# Patient Record
Sex: FEMALE | Race: WHITE | Employment: OTHER | URBAN - METROPOLITAN AREA
[De-identification: names, ages, dates, MRNs, and addresses within clinical notes are randomized per-mention and may not be internally consistent; named-entity substitution may affect disease eponyms.]

---

## 2024-09-21 RX ORDER — SEMAGLUTIDE 2.68 MG/ML
2 INJECTION, SOLUTION SUBCUTANEOUS WEEKLY
COMMUNITY
Start: 2022-09-15

## 2024-09-24 RX ORDER — POTASSIUM CHLORIDE 750 MG/1
10 TABLET, EXTENDED RELEASE ORAL DAILY
COMMUNITY
Start: 2022-09-15

## 2024-09-24 RX ORDER — ENALAPRIL MALEATE 20 MG/1
20 TABLET ORAL EVERY MORNING
COMMUNITY
Start: 2024-08-28

## 2024-09-24 RX ORDER — MONTELUKAST SODIUM 10 MG/1
10 TABLET ORAL NIGHTLY
COMMUNITY
Start: 2024-03-08 | End: 2024-10-09

## 2024-09-24 RX ORDER — FEXOFENADINE HCL 180 MG/1
180 TABLET ORAL DAILY
COMMUNITY

## 2024-09-24 RX ORDER — ATORVASTATIN CALCIUM 10 MG/1
10 TABLET, FILM COATED ORAL NIGHTLY
COMMUNITY
Start: 2024-02-06

## 2024-09-24 RX ORDER — LEVOTHYROXINE SODIUM 50 UG/1
50 TABLET ORAL DAILY
COMMUNITY
Start: 2024-08-28

## 2024-09-24 RX ORDER — CARVEDILOL 3.12 MG/1
1 TABLET ORAL EVERY 12 HOURS
COMMUNITY
Start: 2022-09-15

## 2024-09-24 RX ORDER — FUROSEMIDE 20 MG/1
20 TABLET ORAL DAILY
COMMUNITY
Start: 2024-08-28

## 2024-09-26 ENCOUNTER — TELEPHONE (OUTPATIENT)
Dept: FAMILY MEDICINE CLINIC | Facility: CLINIC | Age: 69
End: 2024-09-26

## 2024-09-26 NOTE — TELEPHONE ENCOUNTER
LTHA on 10/08/24 with Dr. De La Paz @ [facility]    H&P- Completed 09/27/2024 with Dr. Eamon Costello   Labs- RBC (3.73), sodium (134), ALT (9), A1C (5.8), MRSA neg, all other labs WNL  EKG- Sinus sunday (56bpm), otherwise WNL

## 2024-09-27 ENCOUNTER — LAB ENCOUNTER (OUTPATIENT)
Dept: LAB | Facility: HOSPITAL | Age: 69
End: 2024-09-27
Attending: FAMILY MEDICINE
Payer: MEDICARE

## 2024-09-27 ENCOUNTER — OFFICE VISIT (OUTPATIENT)
Dept: FAMILY MEDICINE CLINIC | Facility: CLINIC | Age: 69
End: 2024-09-27
Payer: MEDICARE

## 2024-09-27 VITALS
WEIGHT: 202 LBS | SYSTOLIC BLOOD PRESSURE: 118 MMHG | TEMPERATURE: 97 F | BODY MASS INDEX: 33.25 KG/M2 | HEIGHT: 65.25 IN | RESPIRATION RATE: 16 BRPM | DIASTOLIC BLOOD PRESSURE: 68 MMHG | HEART RATE: 60 BPM | OXYGEN SATURATION: 98 %

## 2024-09-27 DIAGNOSIS — Z01.818 PREOP EXAMINATION: ICD-10-CM

## 2024-09-27 DIAGNOSIS — M16.12 PRIMARY OSTEOARTHRITIS OF LEFT HIP: Primary | ICD-10-CM

## 2024-09-27 DIAGNOSIS — I10 PRIMARY HYPERTENSION: ICD-10-CM

## 2024-09-27 DIAGNOSIS — M15.0 PRIMARY OSTEOARTHRITIS INVOLVING MULTIPLE JOINTS: ICD-10-CM

## 2024-09-27 DIAGNOSIS — R73.01 ELEVATED FASTING BLOOD SUGAR: ICD-10-CM

## 2024-09-27 DIAGNOSIS — E78.2 MIXED HYPERLIPIDEMIA: ICD-10-CM

## 2024-09-27 DIAGNOSIS — K58.0 IRRITABLE BOWEL SYNDROME WITH DIARRHEA: ICD-10-CM

## 2024-09-27 DIAGNOSIS — N18.31 STAGE 3A CHRONIC KIDNEY DISEASE (HCC): ICD-10-CM

## 2024-09-27 DIAGNOSIS — J45.20 MILD INTERMITTENT ASTHMA WITHOUT COMPLICATION (HCC): ICD-10-CM

## 2024-09-27 DIAGNOSIS — G47.33 OSA (OBSTRUCTIVE SLEEP APNEA): ICD-10-CM

## 2024-09-27 DIAGNOSIS — J30.9 ALLERGIC SINUSITIS: ICD-10-CM

## 2024-09-27 DIAGNOSIS — K21.00 GASTROESOPHAGEAL REFLUX DISEASE WITH ESOPHAGITIS WITHOUT HEMORRHAGE: ICD-10-CM

## 2024-09-27 DIAGNOSIS — E03.9 ACQUIRED HYPOTHYROIDISM: ICD-10-CM

## 2024-09-27 DIAGNOSIS — F41.9 ANXIETY: ICD-10-CM

## 2024-09-27 DIAGNOSIS — E11.9 TYPE 2 DIABETES MELLITUS WITHOUT COMPLICATION, WITHOUT LONG-TERM CURRENT USE OF INSULIN (HCC): ICD-10-CM

## 2024-09-27 DIAGNOSIS — Z01.812 PRE-OPERATIVE LABORATORY EXAMINATION: ICD-10-CM

## 2024-09-27 DIAGNOSIS — Z87.898 HISTORY OF POSTOPERATIVE NAUSEA AND VOMITING: ICD-10-CM

## 2024-09-27 LAB
ALBUMIN SERPL-MCNC: 4.1 G/DL (ref 3.2–4.8)
ALBUMIN/GLOB SERPL: 1.5 {RATIO} (ref 1–2)
ALP LIVER SERPL-CCNC: 72 U/L
ALT SERPL-CCNC: 9 U/L
ANION GAP SERPL CALC-SCNC: 3 MMOL/L (ref 0–18)
ANTIBODY SCREEN: NEGATIVE
AST SERPL-CCNC: 17 U/L (ref ?–34)
ATRIAL RATE: 56 BPM
BASOPHILS # BLD AUTO: 0.13 X10(3) UL (ref 0–0.2)
BASOPHILS NFR BLD AUTO: 1.6 %
BILIRUB SERPL-MCNC: 0.4 MG/DL (ref 0.2–1.1)
BUN BLD-MCNC: 11 MG/DL (ref 9–23)
BUN/CREAT SERPL: 10.9 (ref 10–20)
CALCIUM BLD-MCNC: 10 MG/DL (ref 8.7–10.4)
CHLORIDE SERPL-SCNC: 102 MMOL/L (ref 98–112)
CO2 SERPL-SCNC: 29 MMOL/L (ref 21–32)
CREAT BLD-MCNC: 1.01 MG/DL
DEPRECATED RDW RBC AUTO: 42.3 FL (ref 35.1–46.3)
EGFRCR SERPLBLD CKD-EPI 2021: 60 ML/MIN/1.73M2 (ref 60–?)
EOSINOPHIL # BLD AUTO: 0.25 X10(3) UL (ref 0–0.7)
EOSINOPHIL NFR BLD AUTO: 3 %
ERYTHROCYTE [DISTWIDTH] IN BLOOD BY AUTOMATED COUNT: 11.9 % (ref 11–15)
EST. AVERAGE GLUCOSE BLD GHB EST-MCNC: 120 MG/DL (ref 68–126)
FASTING STATUS PATIENT QL REPORTED: YES
GLOBULIN PLAS-MCNC: 2.8 G/DL (ref 2–3.5)
GLUCOSE BLD-MCNC: 99 MG/DL (ref 70–99)
HBA1C MFR BLD: 5.8 % (ref ?–5.7)
HCT VFR BLD AUTO: 36.1 %
HGB BLD-MCNC: 12.3 G/DL
IMM GRANULOCYTES # BLD AUTO: 0.02 X10(3) UL (ref 0–1)
IMM GRANULOCYTES NFR BLD: 0.2 %
LYMPHOCYTES # BLD AUTO: 2.76 X10(3) UL (ref 1–4)
LYMPHOCYTES NFR BLD AUTO: 33 %
MCH RBC QN AUTO: 33 PG (ref 26–34)
MCHC RBC AUTO-ENTMCNC: 34.1 G/DL (ref 31–37)
MCV RBC AUTO: 96.8 FL
MONOCYTES # BLD AUTO: 0.78 X10(3) UL (ref 0.1–1)
MONOCYTES NFR BLD AUTO: 9.3 %
NEUTROPHILS # BLD AUTO: 4.42 X10 (3) UL (ref 1.5–7.7)
NEUTROPHILS # BLD AUTO: 4.42 X10(3) UL (ref 1.5–7.7)
NEUTROPHILS NFR BLD AUTO: 52.9 %
OSMOLALITY SERPL CALC.SUM OF ELEC: 277 MOSM/KG (ref 275–295)
P AXIS: 87 DEGREES
P-R INTERVAL: 182 MS
PLATELET # BLD AUTO: 347 10(3)UL (ref 150–450)
POTASSIUM SERPL-SCNC: 4.5 MMOL/L (ref 3.5–5.1)
PROT SERPL-MCNC: 6.9 G/DL (ref 5.7–8.2)
Q-T INTERVAL: 438 MS
QRS DURATION: 78 MS
QTC CALCULATION (BEZET): 422 MS
R AXIS: 66 DEGREES
RBC # BLD AUTO: 3.73 X10(6)UL
RH BLOOD TYPE: POSITIVE
SODIUM SERPL-SCNC: 134 MMOL/L (ref 136–145)
T AXIS: 72 DEGREES
VENTRICULAR RATE: 56 BPM
WBC # BLD AUTO: 8.4 X10(3) UL (ref 4–11)

## 2024-09-27 PROCEDURE — 86901 BLOOD TYPING SEROLOGIC RH(D): CPT | Performed by: FAMILY MEDICINE

## 2024-09-27 PROCEDURE — 86900 BLOOD TYPING SEROLOGIC ABO: CPT | Performed by: FAMILY MEDICINE

## 2024-09-27 PROCEDURE — 86850 RBC ANTIBODY SCREEN: CPT | Performed by: FAMILY MEDICINE

## 2024-09-27 PROCEDURE — 93010 ELECTROCARDIOGRAM REPORT: CPT | Performed by: INTERNAL MEDICINE

## 2024-09-27 PROCEDURE — 36415 COLL VENOUS BLD VENIPUNCTURE: CPT | Performed by: FAMILY MEDICINE

## 2024-09-27 PROCEDURE — 93005 ELECTROCARDIOGRAM TRACING: CPT

## 2024-09-27 PROCEDURE — 87081 CULTURE SCREEN ONLY: CPT

## 2024-09-27 PROCEDURE — 83036 HEMOGLOBIN GLYCOSYLATED A1C: CPT

## 2024-09-27 PROCEDURE — 99204 OFFICE O/P NEW MOD 45 MIN: CPT | Performed by: FAMILY MEDICINE

## 2024-09-27 PROCEDURE — 80053 COMPREHEN METABOLIC PANEL: CPT

## 2024-09-27 PROCEDURE — 85025 COMPLETE CBC W/AUTO DIFF WBC: CPT

## 2024-09-27 NOTE — H&P (VIEW-ONLY)
[facility] PRE-OP CLINIC [place]    PRE-OP NOTE    HPI:   I have been consulted by Dr. De La Paz to see Samira Nicolas 69 year old female for a preoperative evaluation and medical clearance. She  has a long history of worsening severe degenerative arthritis of her left hip . Patient is to have a left total hip arthroplasty  by Dr. DeL a Paz  on 10/8/2024.     Pt suffers significant pain and loss of function.     She has no cardiopulmonary symptoms.      No history of  DVT. Denies tobacco use.       She has untreated GRACY      Family History   Problem Relation Age of Onset    Other (lung ca) Father     Brain Cancer Mother     Other (lung ca) Mother     Other (HR2) Sister     Other (stomach tummor) Brother       Current Outpatient Medications   Medication Sig Dispense Refill    fexofenadine 180 MG Oral Tab Take 1 tablet (180 mg total) by mouth daily.      furosemide 20 MG Oral Tab Take 1 tablet (20 mg total) by mouth daily.      levothyroxine 50 MCG Oral Tab Take 1 tablet (50 mcg total) by mouth daily.      carvedilol 3.125 MG Oral Tab Take 1 tablet (3.125 mg total) by mouth Q12H.      enalapril 20 MG Oral Tab Take 1 tablet (20 mg total) by mouth every morning.      FLUoxetine 20 MG Oral Cap Take 1 capsule (20 mg total) by mouth daily.      cholecalciferol 125 MCG (5000 UT) Oral Tab Take 1 tablet (5,000 Units total) by mouth daily.      metFORMIN 500 MG Oral Tab Take 1 tablet (500 mg total) by mouth 2 (two) times daily with meals.      atorvastatin 10 MG Oral Tab Take 1 tablet (10 mg total) by mouth nightly.      montelukast 10 MG Oral Tab Take 1 tablet (10 mg total) by mouth nightly.      Potassium Chloride ER 10 MEQ Oral Tab CR Take 1 tablet (10 mEq total) by mouth daily.      omeprazole 20 MG Oral Capsule Delayed Release Take 1 capsule (20 mg total) by mouth every morning.      semaglutide (OZEMPIC, 2 MG/DOSE,) 8 MG/3ML Subcutaneous Solution Pen-injector Inject 2 mg into the skin once a week.       Past Medical History:     Anesthesia complication    slow to wake post-op    Anxiety state    Arthritis    Asthma (HCC)    mild- due year round allergies    Disorder of thyroid    hypothyroidism    Esophageal reflux    High blood pressure    High cholesterol    IBS (irritable bowel syndrome)    Osteoarthritis    PONV (postoperative nausea and vomiting)    Renal disorder    virus that spills protien    Sleep apnea    does not use CPAP due to weight loss    Type 2 Diabetes     Past Surgical History:   Procedure Laterality Date    Cholecystectomy      Colonoscopy      Hip replacement surgery Right 2016     Social History     Socioeconomic History    Marital status: Not on file     Spouse name: Not on file    Number of children: Not on file    Years of education: Not on file    Highest education level: Not on file   Occupational History    Not on file   Tobacco Use    Smoking status: Former     Types: Cigarettes     Passive exposure: Never    Smokeless tobacco: Never    Tobacco comments:     Quit 2014   Vaping Use    Vaping status: Never Used   Substance and Sexual Activity    Alcohol use: Not Currently     Alcohol/week: 1.0 standard drink of alcohol     Types: 1 Glasses of wine per week    Drug use: Never    Sexual activity: Not on file   Other Topics Concern    Not on file   Social History Narrative    Not on file     Social Determinants of Health     Financial Resource Strain: Not on file   Food Insecurity: Not on file   Transportation Needs: Not on file   Physical Activity: Not on file   Stress: Not on file   Social Connections: Unknown (3/8/2021)    Received from [facility]    Social Connections     Conversations with friends/family/neighbors per week: Not on file   Housing Stability: Low Risk  (7/8/2021)    Received from [facility]    Housing Stability     Mortgage Payment Concerns?: Not on file     Number of Places Lived in the Last Year: Not on file     Unstable Housing?: Not on file        REVIEW OF SYSTEMS:   CONSTITUTIONAL:  Denies unusual weight gain/loss, fever, chills  EENT:  Eyes:  Denies eye pain, visual loss, blurred vision, double vision. Ears, Nose, Throat:  Denies congestion, runny nose or sore throat.  INTEGUMENTARY:  Denies rashes, itching, skin lesion,   CARDIOVASCULAR:  Denies DVT. Denies chest pain, palpitations, edema, dyspnea  RESPIRATORY: has  GRACY ,Denies shortness of breath, wheezing, cough  GASTROINTESTINAL:  Denies abdominal pain, nausea, vomiting, constipation, diarrhea, or blood in stool.  MUSCULOSKELETAL: severe pain to her left hip as noted above  NEUROLOGICAL:  Denies headache, seizures, dizziness, syncope, paralysis, ataxia,  HEMATOLOGIC:  Denies anemia, bleeding or bruising.  LYMPHATICS:  Denies enlarged nodes   PSYCHIATRIC:  Denies depression or anxiety.  ENDOCRINOLOGIC: DM 2 YES,   ALLERGIES:  Denies allergic response, history of asthma, hives,     EXAM:   /68 (BP Location: Left arm)   Pulse 60   Temp 97 °F (36.1 °C) (Temporal)   Resp 16   Ht 5' 5.25\" (1.657 m)   Wt 202 lb (91.6 kg)   SpO2 98%   BMI 33.36 kg/m²  Estimated body mass index is 33.36 kg/m² as calculated from the following:    Height as of this encounter: 5' 5.25\" (1.657 m).    Weight as of this encounter: 202 lb (91.6 kg).   Vital signs reviewed.Appears stated age, well groomed.  Physical Exam:  GEN:  Patient is alert, awake and oriented, well developed, well nourished, no apparent distress.  HEENT:  Head:  Normocephalic, atraumatic Nose: patent, no nasal discharge  NECK: Supple, no CLAD, no carotid bruit, no thyromegaly.  SKIN: No rashes, no skin lesion, no bruising, good turgor.  HEART:  Regular rate and rhythm, no murmurs, rubs or gallops.  LUNGS: Clear to auscultation bilterally, no rales/rhonchi/wheezing.  CHEST: No tenderness.  ABDOMEN:  Soft, nondistended, nontender,  no masses, no hepatosplenomegaly.  BACK: No tenderness, no spasm,   EXTREMITIES:  restricted ROM to her left hip ,    NEURO:  No focal deficit, speech fluent, she has an antalgic  gait, strength and tone, sensory intact      Lab Results   Component Value Date    WBC 8.4 09/27/2024    HGB 12.3 09/27/2024    HCT 36.1 09/27/2024    .0 09/27/2024       No results found.    EKG 12 Lead    Result Date: 9/27/2024  Sinus bradycardia Otherwise normal ECG No previous ECGs found in [place]     ASSESSMENT AND PLAN:   Samira Nicolas is a 69 year old female, with a hx of severe degenerative arthritis of her left hip  who presents for a pre-operative physical exam. Patient is to have a left total hip arthoplasty  by Dr. De La Paz  on 10/8/2024.      ICD-10-CM    1. Primary osteoarthritis of left hip  M16.12       2. Anxiety  F41.9       3. Mild intermittent asthma without complication ([facility])  J45.20       4. Acquired hypothyroidism  E03.9       5. Gastroesophageal reflux disease with esophagitis without hemorrhage  K21.00       6. Primary hypertension  I10       7. Mixed hyperlipidemia  E78.2       8. Irritable bowel syndrome with diarrhea  K58.0       9. History of postoperative nausea and vomiting  Z87.898       10. Stage 3a chronic kidney disease ([facility])  N18.31       11. GRACY (obstructive sleep apnea)  G47.33       12. Type 2 diabetes mellitus without complication, without long-term current use of insulin ([facility])  E11.9       13. Primary osteoarthritis involving multiple joints S/P right total hip arthroplasty  M15.0       14. Allergic sinusitis  J30.9       15. BMI 33.0-33.9,adult  Z68.33       16. Elevated fasting blood sugar  R73.01 CBC With Differential With Platelet     Comp Metabolic Panel (14)     EKG 12 Lead     Hemoglobin A1C     MSSA and MRSA Culture Screen     Type and screen      17. Pre-operative laboratory examination  Z01.812 CBC With Differential With Platelet     Comp Metabolic Panel (14)     EKG 12 Lead     Hemoglobin A1C     MSSA and MRSA Culture Screen     Type and screen      18. Preop examination  Z01.818 CBC With Differential  With Platelet     Comp Metabolic Panel (14)     EKG 12 Lead     Hemoglobin A1C     MSSA and MRSA Culture Screen     Type and screen       ECG and labs are pending review     Preoperative Risk Stratification: There are no decompensated medical conditions. ASA classification 2    Medical history:  High risk surgery (vascular, thoracic, intra-peritoneal): No  CAD: No  CHF: No  Stroke: No  DM on insulin: No  Serum Creatinine >2 mg/dl: No  Patient has an RCRI score that is  low risk and is at low risk for major cardiac event in the perioperative period.     Patient is medically optimized and has an acceptable risk of surgery and may proceed with surgery as planned.     PLAN:    Patient to discontinue medications and supplements with anticoagulation properties as per instruction.        Postoperative Recommendations:    Anticoagulation / DVT prophylaxis: SCDs, early ambulation.  mg twice daily with food for four weeks.    GI protection: Protonix  Incentive Spirometry   Telemetry as needed  CPAP/O2 as needed   DM: QID glucoscans and sliding scale insulin as needed   Renal protection: (hydration / NSAID and ACE/ARB avoidance)   Cognitive protection: (minimize narcotics, benzodiazepines, scopolamine)     Pain management and Physical therapy as per Orthopedic service.   Home Health as needed    Thank you for the opportunity to care for your patient and to assist in managing the postoperative course.        Eamon Costello DO  9/27/2024  11:25 AM

## 2024-09-27 NOTE — H&P
[facility] PRE-OP CLINIC [place]    PRE-OP NOTE    HPI:   I have been consulted by Dr. De La Paz to see Samira Nicolas 69 year old female for a preoperative evaluation and medical clearance. She  has a long history of worsening severe degenerative arthritis of her left hip . Patient is to have a left total hip arthroplasty  by Dr. De La Paz  on 10/8/2024.     Pt suffers significant pain and loss of function.     She has no cardiopulmonary symptoms.      No history of  DVT. Denies tobacco use.       She has untreated GRACY      Family History   Problem Relation Age of Onset    Other (lung ca) Father     Brain Cancer Mother     Other (lung ca) Mother     Other (HR2) Sister     Other (stomach tummor) Brother       Current Outpatient Medications   Medication Sig Dispense Refill    fexofenadine 180 MG Oral Tab Take 1 tablet (180 mg total) by mouth daily.      furosemide 20 MG Oral Tab Take 1 tablet (20 mg total) by mouth daily.      levothyroxine 50 MCG Oral Tab Take 1 tablet (50 mcg total) by mouth daily.      carvedilol 3.125 MG Oral Tab Take 1 tablet (3.125 mg total) by mouth Q12H.      enalapril 20 MG Oral Tab Take 1 tablet (20 mg total) by mouth every morning.      FLUoxetine 20 MG Oral Cap Take 1 capsule (20 mg total) by mouth daily.      cholecalciferol 125 MCG (5000 UT) Oral Tab Take 1 tablet (5,000 Units total) by mouth daily.      metFORMIN 500 MG Oral Tab Take 1 tablet (500 mg total) by mouth 2 (two) times daily with meals.      atorvastatin 10 MG Oral Tab Take 1 tablet (10 mg total) by mouth nightly.      montelukast 10 MG Oral Tab Take 1 tablet (10 mg total) by mouth nightly.      Potassium Chloride ER 10 MEQ Oral Tab CR Take 1 tablet (10 mEq total) by mouth daily.      omeprazole 20 MG Oral Capsule Delayed Release Take 1 capsule (20 mg total) by mouth every morning.      semaglutide (OZEMPIC, 2 MG/DOSE,) 8 MG/3ML Subcutaneous Solution Pen-injector Inject 2 mg into the skin once a week.       Past Medical History:     Anesthesia complication    slow to wake post-op    Anxiety state    Arthritis    Asthma (HCC)    mild- due year round allergies    Disorder of thyroid    hypothyroidism    Esophageal reflux    High blood pressure    High cholesterol    IBS (irritable bowel syndrome)    Osteoarthritis    PONV (postoperative nausea and vomiting)    Renal disorder    virus that spills protien    Sleep apnea    does not use CPAP due to weight loss    Type 2 Diabetes     Past Surgical History:   Procedure Laterality Date    Cholecystectomy      Colonoscopy      Hip replacement surgery Right 2016     Social History     Socioeconomic History    Marital status: Not on file     Spouse name: Not on file    Number of children: Not on file    Years of education: Not on file    Highest education level: Not on file   Occupational History    Not on file   Tobacco Use    Smoking status: Former     Types: Cigarettes     Passive exposure: Never    Smokeless tobacco: Never    Tobacco comments:     Quit 2014   Vaping Use    Vaping status: Never Used   Substance and Sexual Activity    Alcohol use: Not Currently     Alcohol/week: 1.0 standard drink of alcohol     Types: 1 Glasses of wine per week    Drug use: Never    Sexual activity: Not on file   Other Topics Concern    Not on file   Social History Narrative    Not on file     Social Determinants of Health     Financial Resource Strain: Not on file   Food Insecurity: Not on file   Transportation Needs: Not on file   Physical Activity: Not on file   Stress: Not on file   Social Connections: Unknown (3/8/2021)    Received from [facility]    Social Connections     Conversations with friends/family/neighbors per week: Not on file   Housing Stability: Low Risk  (7/8/2021)    Received from [facility]    Housing Stability     Mortgage Payment Concerns?: Not on file     Number of Places Lived in the Last Year: Not on file     Unstable Housing?: Not on file        REVIEW OF SYSTEMS:   CONSTITUTIONAL:  Denies unusual weight gain/loss, fever, chills  EENT:  Eyes:  Denies eye pain, visual loss, blurred vision, double vision. Ears, Nose, Throat:  Denies congestion, runny nose or sore throat.  INTEGUMENTARY:  Denies rashes, itching, skin lesion,   CARDIOVASCULAR:  Denies DVT. Denies chest pain, palpitations, edema, dyspnea  RESPIRATORY: has  GRACY ,Denies shortness of breath, wheezing, cough  GASTROINTESTINAL:  Denies abdominal pain, nausea, vomiting, constipation, diarrhea, or blood in stool.  MUSCULOSKELETAL: severe pain to her left hip as noted above  NEUROLOGICAL:  Denies headache, seizures, dizziness, syncope, paralysis, ataxia,  HEMATOLOGIC:  Denies anemia, bleeding or bruising.  LYMPHATICS:  Denies enlarged nodes   PSYCHIATRIC:  Denies depression or anxiety.  ENDOCRINOLOGIC: DM 2 YES,   ALLERGIES:  Denies allergic response, history of asthma, hives,     EXAM:   /68 (BP Location: Left arm)   Pulse 60   Temp 97 °F (36.1 °C) (Temporal)   Resp 16   Ht 5' 5.25\" (1.657 m)   Wt 202 lb (91.6 kg)   SpO2 98%   BMI 33.36 kg/m²  Estimated body mass index is 33.36 kg/m² as calculated from the following:    Height as of this encounter: 5' 5.25\" (1.657 m).    Weight as of this encounter: 202 lb (91.6 kg).   Vital signs reviewed.Appears stated age, well groomed.  Physical Exam:  GEN:  Patient is alert, awake and oriented, well developed, well nourished, no apparent distress.  HEENT:  Head:  Normocephalic, atraumatic Nose: patent, no nasal discharge  NECK: Supple, no CLAD, no carotid bruit, no thyromegaly.  SKIN: No rashes, no skin lesion, no bruising, good turgor.  HEART:  Regular rate and rhythm, no murmurs, rubs or gallops.  LUNGS: Clear to auscultation bilterally, no rales/rhonchi/wheezing.  CHEST: No tenderness.  ABDOMEN:  Soft, nondistended, nontender,  no masses, no hepatosplenomegaly.  BACK: No tenderness, no spasm,   EXTREMITIES:  restricted ROM to her left hip ,    NEURO:  No focal deficit, speech fluent, she has an antalgic  gait, strength and tone, sensory intact      Lab Results   Component Value Date    WBC 8.4 09/27/2024    HGB 12.3 09/27/2024    HCT 36.1 09/27/2024    .0 09/27/2024       No results found.    EKG 12 Lead    Result Date: 9/27/2024  Sinus bradycardia Otherwise normal ECG No previous ECGs found in [place]     ASSESSMENT AND PLAN:   Samira Nicolas is a 69 year old female, with a hx of severe degenerative arthritis of her left hip  who presents for a pre-operative physical exam. Patient is to have a left total hip arthoplasty  by Dr. De La Paz  on 10/8/2024.      ICD-10-CM    1. Primary osteoarthritis of left hip  M16.12       2. Anxiety  F41.9       3. Mild intermittent asthma without complication ([facility])  J45.20       4. Acquired hypothyroidism  E03.9       5. Gastroesophageal reflux disease with esophagitis without hemorrhage  K21.00       6. Primary hypertension  I10       7. Mixed hyperlipidemia  E78.2       8. Irritable bowel syndrome with diarrhea  K58.0       9. History of postoperative nausea and vomiting  Z87.898       10. Stage 3a chronic kidney disease ([facility])  N18.31       11. GRACY (obstructive sleep apnea)  G47.33       12. Type 2 diabetes mellitus without complication, without long-term current use of insulin ([facility])  E11.9       13. Primary osteoarthritis involving multiple joints S/P right total hip arthroplasty  M15.0       14. Allergic sinusitis  J30.9       15. BMI 33.0-33.9,adult  Z68.33       16. Elevated fasting blood sugar  R73.01 CBC With Differential With Platelet     Comp Metabolic Panel (14)     EKG 12 Lead     Hemoglobin A1C     MSSA and MRSA Culture Screen     Type and screen      17. Pre-operative laboratory examination  Z01.812 CBC With Differential With Platelet     Comp Metabolic Panel (14)     EKG 12 Lead     Hemoglobin A1C     MSSA and MRSA Culture Screen     Type and screen      18. Preop examination  Z01.818 CBC With Differential  With Platelet     Comp Metabolic Panel (14)     EKG 12 Lead     Hemoglobin A1C     MSSA and MRSA Culture Screen     Type and screen       ECG and labs are pending review     Preoperative Risk Stratification: There are no decompensated medical conditions. ASA classification 2    Medical history:  High risk surgery (vascular, thoracic, intra-peritoneal): No  CAD: No  CHF: No  Stroke: No  DM on insulin: No  Serum Creatinine >2 mg/dl: No  Patient has an RCRI score that is  low risk and is at low risk for major cardiac event in the perioperative period.     Patient is medically optimized and has an acceptable risk of surgery and may proceed with surgery as planned.     PLAN:    Patient to discontinue medications and supplements with anticoagulation properties as per instruction.        Postoperative Recommendations:    Anticoagulation / DVT prophylaxis: SCDs, early ambulation.  mg twice daily with food for four weeks.    GI protection: Protonix  Incentive Spirometry   Telemetry as needed  CPAP/O2 as needed   DM: QID glucoscans and sliding scale insulin as needed   Renal protection: (hydration / NSAID and ACE/ARB avoidance)   Cognitive protection: (minimize narcotics, benzodiazepines, scopolamine)     Pain management and Physical therapy as per Orthopedic service.   Home Health as needed    Thank you for the opportunity to care for your patient and to assist in managing the postoperative course.        Eamon Costello DO  9/27/2024  11:25 AM

## 2024-09-27 NOTE — TELEPHONE ENCOUNTER
Dr. Costello, please review:    Labs- RBC (3.73), sodium (134), ALT (9), A1C (5.8), MRSA neg, all other labs WNL    EKG- Sinus sunday (56bpm), otherwise WNL    OK for surgery?

## 2024-09-30 NOTE — TELEPHONE ENCOUNTER
Spoke to patient, went over test results and let her know she is clear for surgery per Dr. Costello.

## 2024-10-04 PROBLEM — M16.12 PRIMARY OSTEOARTHRITIS OF ONE HIP, LEFT: Status: ACTIVE | Noted: 2024-10-04

## 2024-10-04 PROBLEM — G47.33 OSA (OBSTRUCTIVE SLEEP APNEA): Status: ACTIVE | Noted: 2017-10-18

## 2024-10-04 PROBLEM — K58.0 IRRITABLE BOWEL SYNDROME WITH DIARRHEA: Status: ACTIVE | Noted: 2020-07-14

## 2024-10-04 PROBLEM — E55.9 VITAMIN D DEFICIENCY: Status: ACTIVE | Noted: 2017-08-03

## 2024-10-04 PROBLEM — E11.9 TYPE 2 DIABETES MELLITUS WITHOUT COMPLICATION, WITHOUT LONG-TERM CURRENT USE OF INSULIN (HCC): Status: ACTIVE | Noted: 2024-10-04

## 2024-10-04 PROBLEM — J45.909 ASTHMA, EXTRINSIC (HCC): Status: ACTIVE | Noted: 2024-10-04

## 2024-10-04 PROBLEM — E78.00 PURE HYPERCHOLESTEROLEMIA: Status: ACTIVE | Noted: 2024-10-04

## 2024-10-04 NOTE — CM/SW NOTE
CM received email from Sarah JIMENEZ, Pt has been referred to [facility] prior to surgery.    RUSH Rodriguez to send referral via [facility] upon admission.   TONY Doll to enter orders and F2F.     Aurora FERNANDEZ LSW, MSW ext. [phone]

## 2024-10-08 ENCOUNTER — APPOINTMENT (OUTPATIENT)
Dept: GENERAL RADIOLOGY | Facility: HOSPITAL | Age: 69
End: 2024-10-08
Attending: STUDENT IN AN ORGANIZED HEALTH CARE EDUCATION/TRAINING PROGRAM
Payer: MEDICARE

## 2024-10-08 ENCOUNTER — ANESTHESIA EVENT (OUTPATIENT)
Dept: SURGERY | Facility: HOSPITAL | Age: 69
End: 2024-10-08
Payer: MEDICARE

## 2024-10-08 ENCOUNTER — HOSPITAL ENCOUNTER (OUTPATIENT)
Facility: HOSPITAL | Age: 69
Discharge: HOME HEALTH | End: 2024-10-09
Attending: ORTHOPAEDIC SURGERY | Admitting: ORTHOPAEDIC SURGERY
Payer: MEDICARE

## 2024-10-08 ENCOUNTER — ANESTHESIA (OUTPATIENT)
Dept: SURGERY | Facility: HOSPITAL | Age: 69
End: 2024-10-08
Payer: MEDICARE

## 2024-10-08 DIAGNOSIS — Z96.642 S/P HIP REPLACEMENT, LEFT: ICD-10-CM

## 2024-10-08 DIAGNOSIS — M16.12 PRIMARY OSTEOARTHRITIS OF ONE HIP, LEFT: Primary | ICD-10-CM

## 2024-10-08 LAB
GLUCOSE BLDC GLUCOMTR-MCNC: 136 MG/DL (ref 70–99)
GLUCOSE BLDC GLUCOMTR-MCNC: 143 MG/DL (ref 70–99)
GLUCOSE BLDC GLUCOMTR-MCNC: 154 MG/DL (ref 70–99)
GLUCOSE BLDC GLUCOMTR-MCNC: 165 MG/DL (ref 70–99)
GLUCOSE BLDC GLUCOMTR-MCNC: 181 MG/DL (ref 70–99)
RH BLOOD TYPE: POSITIVE

## 2024-10-08 PROCEDURE — 88305 TISSUE EXAM BY PATHOLOGIST: CPT | Performed by: ORTHOPAEDIC SURGERY

## 2024-10-08 PROCEDURE — 82962 GLUCOSE BLOOD TEST: CPT

## 2024-10-08 PROCEDURE — 0SRB04Z REPLACEMENT OF LEFT HIP JOINT WITH CERAMIC ON POLYETHYLENE SYNTHETIC SUBSTITUTE, OPEN APPROACH: ICD-10-PCS | Performed by: ORTHOPAEDIC SURGERY

## 2024-10-08 PROCEDURE — 97530 THERAPEUTIC ACTIVITIES: CPT

## 2024-10-08 PROCEDURE — 73501 X-RAY EXAM HIP UNI 1 VIEW: CPT | Performed by: STUDENT IN AN ORGANIZED HEALTH CARE EDUCATION/TRAINING PROGRAM

## 2024-10-08 PROCEDURE — 97161 PT EVAL LOW COMPLEX 20 MIN: CPT

## 2024-10-08 PROCEDURE — 88311 DECALCIFY TISSUE: CPT | Performed by: ORTHOPAEDIC SURGERY

## 2024-10-08 DEVICE — IMPLANTABLE DEVICE
Type: IMPLANTABLE DEVICE | Site: HIP | Status: FUNCTIONAL
Brand: G7® VIVACIT-E®

## 2024-10-08 DEVICE — IMPLANTABLE DEVICE: Type: IMPLANTABLE DEVICE | Site: HIP | Status: FUNCTIONAL

## 2024-10-08 DEVICE — IMPLANTABLE DEVICE
Type: IMPLANTABLE DEVICE | Site: HIP | Status: FUNCTIONAL
Brand: G7® ACETABULAR SYSTEM

## 2024-10-08 DEVICE — BIOLOX® DELTA, CERAMIC FEMORAL HEAD, M, Ø 40/0, TAPER 12/14
Type: IMPLANTABLE DEVICE | Site: HIP | Status: FUNCTIONAL
Brand: BIOLOX® DELTA

## 2024-10-08 RX ORDER — DEXAMETHASONE SODIUM PHOSPHATE 4 MG/ML
VIAL (ML) INJECTION AS NEEDED
Status: DISCONTINUED | OUTPATIENT
Start: 2024-10-08 | End: 2024-10-08 | Stop reason: SURG

## 2024-10-08 RX ORDER — GLYCOPYRROLATE 0.2 MG/ML
INJECTION, SOLUTION INTRAMUSCULAR; INTRAVENOUS AS NEEDED
Status: DISCONTINUED | OUTPATIENT
Start: 2024-10-08 | End: 2024-10-08 | Stop reason: SURG

## 2024-10-08 RX ORDER — LIDOCAINE HYDROCHLORIDE 10 MG/ML
INJECTION, SOLUTION INFILTRATION; PERINEURAL
Status: COMPLETED | OUTPATIENT
Start: 2024-10-08 | End: 2024-10-08

## 2024-10-08 RX ORDER — NICOTINE POLACRILEX 4 MG
30 LOZENGE BUCCAL
Status: DISCONTINUED | OUTPATIENT
Start: 2024-10-08 | End: 2024-10-08 | Stop reason: HOSPADM

## 2024-10-08 RX ORDER — ONDANSETRON 2 MG/ML
4 INJECTION INTRAMUSCULAR; INTRAVENOUS EVERY 6 HOURS PRN
Status: DISCONTINUED | OUTPATIENT
Start: 2024-10-08 | End: 2024-10-09

## 2024-10-08 RX ORDER — CARVEDILOL 3.12 MG/1
3.12 TABLET ORAL EVERY 12 HOURS
Status: DISCONTINUED | OUTPATIENT
Start: 2024-10-08 | End: 2024-10-09

## 2024-10-08 RX ORDER — HYDROMORPHONE HYDROCHLORIDE 1 MG/ML
0.4 INJECTION, SOLUTION INTRAMUSCULAR; INTRAVENOUS; SUBCUTANEOUS EVERY 5 MIN PRN
Status: DISCONTINUED | OUTPATIENT
Start: 2024-10-08 | End: 2024-10-08 | Stop reason: HOSPADM

## 2024-10-08 RX ORDER — METOCLOPRAMIDE 10 MG/1
10 TABLET ORAL ONCE
Status: COMPLETED | OUTPATIENT
Start: 2024-10-08 | End: 2024-10-08

## 2024-10-08 RX ORDER — FAMOTIDINE 20 MG/1
20 TABLET, FILM COATED ORAL ONCE
Status: COMPLETED | OUTPATIENT
Start: 2024-10-08 | End: 2024-10-08

## 2024-10-08 RX ORDER — CELECOXIB 200 MG/1
200 CAPSULE ORAL ONCE
Status: COMPLETED | OUTPATIENT
Start: 2024-10-08 | End: 2024-10-08

## 2024-10-08 RX ORDER — FUROSEMIDE 20 MG/1
20 TABLET ORAL DAILY
Status: DISCONTINUED | OUTPATIENT
Start: 2024-10-09 | End: 2024-10-09

## 2024-10-08 RX ORDER — OXYCODONE HYDROCHLORIDE 5 MG/1
5 TABLET ORAL EVERY 4 HOURS PRN
Status: SHIPPED | COMMUNITY
Start: 2024-10-08

## 2024-10-08 RX ORDER — POLYETHYLENE GLYCOL 3350 17 G/17G
17 POWDER, FOR SOLUTION ORAL DAILY PRN
Status: DISCONTINUED | OUTPATIENT
Start: 2024-10-08 | End: 2024-10-09

## 2024-10-08 RX ORDER — METOPROLOL TARTRATE 25 MG/1
25 TABLET, FILM COATED ORAL ONCE AS NEEDED
Status: DISCONTINUED | OUTPATIENT
Start: 2024-10-08 | End: 2024-10-08 | Stop reason: HOSPADM

## 2024-10-08 RX ORDER — ASPIRIN 325 MG
325 TABLET ORAL 2 TIMES DAILY
Status: DISCONTINUED | OUTPATIENT
Start: 2024-10-08 | End: 2024-10-09

## 2024-10-08 RX ORDER — TRAMADOL HYDROCHLORIDE 50 MG/1
50 TABLET ORAL EVERY 6 HOURS SCHEDULED
Status: DISCONTINUED | OUTPATIENT
Start: 2024-10-08 | End: 2024-10-09

## 2024-10-08 RX ORDER — SODIUM CHLORIDE, SODIUM LACTATE, POTASSIUM CHLORIDE, CALCIUM CHLORIDE 600; 310; 30; 20 MG/100ML; MG/100ML; MG/100ML; MG/100ML
INJECTION, SOLUTION INTRAVENOUS CONTINUOUS
Status: DISCONTINUED | OUTPATIENT
Start: 2024-10-08 | End: 2024-10-09

## 2024-10-08 RX ORDER — LEVOTHYROXINE SODIUM 50 UG/1
50 TABLET ORAL DAILY
Status: DISCONTINUED | OUTPATIENT
Start: 2024-10-08 | End: 2024-10-09

## 2024-10-08 RX ORDER — DEXTROSE MONOHYDRATE 25 G/50ML
50 INJECTION, SOLUTION INTRAVENOUS
Status: DISCONTINUED | OUTPATIENT
Start: 2024-10-08 | End: 2024-10-08 | Stop reason: HOSPADM

## 2024-10-08 RX ORDER — MIDAZOLAM HYDROCHLORIDE 1 MG/ML
INJECTION INTRAMUSCULAR; INTRAVENOUS AS NEEDED
Status: DISCONTINUED | OUTPATIENT
Start: 2024-10-08 | End: 2024-10-08 | Stop reason: SURG

## 2024-10-08 RX ORDER — HYDROMORPHONE HYDROCHLORIDE 1 MG/ML
0.4 INJECTION, SOLUTION INTRAMUSCULAR; INTRAVENOUS; SUBCUTANEOUS EVERY 2 HOUR PRN
Status: DISCONTINUED | OUTPATIENT
Start: 2024-10-08 | End: 2024-10-09

## 2024-10-08 RX ORDER — ATORVASTATIN CALCIUM 10 MG/1
10 TABLET, FILM COATED ORAL NIGHTLY
Status: DISCONTINUED | OUTPATIENT
Start: 2024-10-08 | End: 2024-10-09

## 2024-10-08 RX ORDER — PSEUDOEPHEDRINE HCL 30 MG
100 TABLET ORAL 2 TIMES DAILY
Qty: 60 CAPSULE | Refills: 0 | Status: SHIPPED | COMMUNITY
Start: 2024-10-08 | End: 2024-10-09

## 2024-10-08 RX ORDER — ACETAMINOPHEN 500 MG
1000 TABLET ORAL EVERY 8 HOURS SCHEDULED
Status: DISCONTINUED | OUTPATIENT
Start: 2024-10-08 | End: 2024-10-09

## 2024-10-08 RX ORDER — BISACODYL 10 MG
10 SUPPOSITORY, RECTAL RECTAL
Status: DISCONTINUED | OUTPATIENT
Start: 2024-10-08 | End: 2024-10-09

## 2024-10-08 RX ORDER — ACETAMINOPHEN 500 MG
1000 TABLET ORAL ONCE
Status: COMPLETED | OUTPATIENT
Start: 2024-10-08 | End: 2024-10-08

## 2024-10-08 RX ORDER — METOCLOPRAMIDE HYDROCHLORIDE 5 MG/ML
10 INJECTION INTRAMUSCULAR; INTRAVENOUS EVERY 8 HOURS PRN
Status: DISCONTINUED | OUTPATIENT
Start: 2024-10-08 | End: 2024-10-09

## 2024-10-08 RX ORDER — MONTELUKAST SODIUM 10 MG/1
10 TABLET ORAL NIGHTLY
Status: DISCONTINUED | OUTPATIENT
Start: 2024-10-08 | End: 2024-10-09

## 2024-10-08 RX ORDER — DIPHENHYDRAMINE HYDROCHLORIDE 50 MG/ML
12.5 INJECTION INTRAMUSCULAR; INTRAVENOUS EVERY 4 HOURS PRN
Status: DISCONTINUED | OUTPATIENT
Start: 2024-10-08 | End: 2024-10-09

## 2024-10-08 RX ORDER — SODIUM PHOSPHATE, DIBASIC AND SODIUM PHOSPHATE, MONOBASIC 7; 19 G/230ML; G/230ML
1 ENEMA RECTAL ONCE AS NEEDED
Status: DISCONTINUED | OUTPATIENT
Start: 2024-10-08 | End: 2024-10-09

## 2024-10-08 RX ORDER — OXYCODONE HYDROCHLORIDE 5 MG/1
5 TABLET ORAL EVERY 4 HOURS PRN
Status: DISCONTINUED | OUTPATIENT
Start: 2024-10-08 | End: 2024-10-09

## 2024-10-08 RX ORDER — MAGNESIUM HYDROXIDE 1200 MG/15ML
LIQUID ORAL CONTINUOUS PRN
Status: DISCONTINUED | OUTPATIENT
Start: 2024-10-08 | End: 2024-10-08 | Stop reason: HOSPADM

## 2024-10-08 RX ORDER — NALOXONE HYDROCHLORIDE 0.4 MG/ML
0.08 INJECTION, SOLUTION INTRAMUSCULAR; INTRAVENOUS; SUBCUTANEOUS AS NEEDED
Status: DISCONTINUED | OUTPATIENT
Start: 2024-10-08 | End: 2024-10-08 | Stop reason: HOSPADM

## 2024-10-08 RX ORDER — TRANEXAMIC ACID 10 MG/ML
INJECTION, SOLUTION INTRAVENOUS AS NEEDED
Status: DISCONTINUED | OUTPATIENT
Start: 2024-10-08 | End: 2024-10-08 | Stop reason: SURG

## 2024-10-08 RX ORDER — ASPIRIN 325 MG
325 TABLET ORAL 2 TIMES DAILY
Qty: 60 TABLET | Refills: 0 | Status: SHIPPED | OUTPATIENT
Start: 2024-10-08

## 2024-10-08 RX ORDER — SENNOSIDES 8.6 MG
17.2 TABLET ORAL NIGHTLY
Status: DISCONTINUED | OUTPATIENT
Start: 2024-10-08 | End: 2024-10-09

## 2024-10-08 RX ORDER — DOCUSATE SODIUM 100 MG/1
100 CAPSULE, LIQUID FILLED ORAL 2 TIMES DAILY
Status: DISCONTINUED | OUTPATIENT
Start: 2024-10-08 | End: 2024-10-09

## 2024-10-08 RX ORDER — HYDROMORPHONE HYDROCHLORIDE 1 MG/ML
0.2 INJECTION, SOLUTION INTRAMUSCULAR; INTRAVENOUS; SUBCUTANEOUS EVERY 2 HOUR PRN
Status: DISCONTINUED | OUTPATIENT
Start: 2024-10-08 | End: 2024-10-09

## 2024-10-08 RX ORDER — ENALAPRIL MALEATE 5 MG/1
20 TABLET ORAL EVERY MORNING
Status: DISCONTINUED | OUTPATIENT
Start: 2024-10-09 | End: 2024-10-09

## 2024-10-08 RX ORDER — SODIUM CHLORIDE, SODIUM LACTATE, POTASSIUM CHLORIDE, CALCIUM CHLORIDE 600; 310; 30; 20 MG/100ML; MG/100ML; MG/100ML; MG/100ML
INJECTION, SOLUTION INTRAVENOUS CONTINUOUS
Status: DISCONTINUED | OUTPATIENT
Start: 2024-10-08 | End: 2024-10-08 | Stop reason: HOSPADM

## 2024-10-08 RX ORDER — HYDROMORPHONE HYDROCHLORIDE 1 MG/ML
0.2 INJECTION, SOLUTION INTRAMUSCULAR; INTRAVENOUS; SUBCUTANEOUS EVERY 5 MIN PRN
Status: DISCONTINUED | OUTPATIENT
Start: 2024-10-08 | End: 2024-10-08 | Stop reason: HOSPADM

## 2024-10-08 RX ORDER — ONDANSETRON 2 MG/ML
INJECTION INTRAMUSCULAR; INTRAVENOUS AS NEEDED
Status: DISCONTINUED | OUTPATIENT
Start: 2024-10-08 | End: 2024-10-08 | Stop reason: SURG

## 2024-10-08 RX ORDER — FAMOTIDINE 10 MG/ML
20 INJECTION, SOLUTION INTRAVENOUS ONCE
Status: COMPLETED | OUTPATIENT
Start: 2024-10-08 | End: 2024-10-08

## 2024-10-08 RX ORDER — DIPHENHYDRAMINE HCL 25 MG
25 CAPSULE ORAL EVERY 4 HOURS PRN
Status: DISCONTINUED | OUTPATIENT
Start: 2024-10-08 | End: 2024-10-09

## 2024-10-08 RX ORDER — OXYCODONE HYDROCHLORIDE 5 MG/1
2.5 TABLET ORAL EVERY 4 HOURS PRN
Status: DISCONTINUED | OUTPATIENT
Start: 2024-10-08 | End: 2024-10-09

## 2024-10-08 RX ORDER — BUPIVACAINE HYDROCHLORIDE 7.5 MG/ML
INJECTION, SOLUTION INTRASPINAL
Status: COMPLETED | OUTPATIENT
Start: 2024-10-08 | End: 2024-10-08

## 2024-10-08 RX ORDER — OXYCODONE HCL 10 MG/1
10 TABLET, FILM COATED, EXTENDED RELEASE ORAL ONCE
Status: COMPLETED | OUTPATIENT
Start: 2024-10-08 | End: 2024-10-08

## 2024-10-08 RX ORDER — NICOTINE POLACRILEX 4 MG
15 LOZENGE BUCCAL
Status: DISCONTINUED | OUTPATIENT
Start: 2024-10-08 | End: 2024-10-08 | Stop reason: HOSPADM

## 2024-10-08 RX ORDER — CELECOXIB 200 MG/1
200 CAPSULE ORAL 2 TIMES DAILY
Status: DISCONTINUED | OUTPATIENT
Start: 2024-10-08 | End: 2024-10-09

## 2024-10-08 RX ORDER — EPHEDRINE SULFATE 50 MG/ML
INJECTION INTRAVENOUS AS NEEDED
Status: DISCONTINUED | OUTPATIENT
Start: 2024-10-08 | End: 2024-10-08 | Stop reason: SURG

## 2024-10-08 RX ORDER — METOCLOPRAMIDE HYDROCHLORIDE 5 MG/ML
10 INJECTION INTRAMUSCULAR; INTRAVENOUS ONCE
Status: COMPLETED | OUTPATIENT
Start: 2024-10-08 | End: 2024-10-08

## 2024-10-08 RX ADMIN — EPHEDRINE SULFATE 10 MG: 50 INJECTION INTRAVENOUS at 07:41:00

## 2024-10-08 RX ADMIN — BUPIVACAINE HYDROCHLORIDE 1.6 ML: 7.5 INJECTION, SOLUTION INTRASPINAL at 07:38:00

## 2024-10-08 RX ADMIN — MIDAZOLAM HYDROCHLORIDE 1 MG: 1 INJECTION INTRAMUSCULAR; INTRAVENOUS at 07:19:00

## 2024-10-08 RX ADMIN — LIDOCAINE HYDROCHLORIDE 5 ML: 10 INJECTION, SOLUTION INFILTRATION; PERINEURAL at 07:14:00

## 2024-10-08 RX ADMIN — TRANEXAMIC ACID 1000 MG: 10 INJECTION, SOLUTION INTRAVENOUS at 07:48:00

## 2024-10-08 RX ADMIN — DEXAMETHASONE SODIUM PHOSPHATE 4 MG: 4 MG/ML VIAL (ML) INJECTION at 08:25:00

## 2024-10-08 RX ADMIN — MIDAZOLAM HYDROCHLORIDE 1 MG: 1 INJECTION INTRAMUSCULAR; INTRAVENOUS at 08:07:00

## 2024-10-08 RX ADMIN — ONDANSETRON 4 MG: 2 INJECTION INTRAMUSCULAR; INTRAVENOUS at 09:19:00

## 2024-10-08 RX ADMIN — SODIUM CHLORIDE, SODIUM LACTATE, POTASSIUM CHLORIDE, CALCIUM CHLORIDE: 600; 310; 30; 20 INJECTION, SOLUTION INTRAVENOUS at 08:45:00

## 2024-10-08 RX ADMIN — EPHEDRINE SULFATE 5 MG: 50 INJECTION INTRAVENOUS at 08:51:00

## 2024-10-08 RX ADMIN — EPHEDRINE SULFATE 5 MG: 50 INJECTION INTRAVENOUS at 08:23:00

## 2024-10-08 RX ADMIN — GLYCOPYRROLATE 0.2 MG: 0.2 INJECTION, SOLUTION INTRAMUSCULAR; INTRAVENOUS at 08:10:00

## 2024-10-08 RX ADMIN — EPHEDRINE SULFATE 5 MG: 50 INJECTION INTRAVENOUS at 08:02:00

## 2024-10-08 RX ADMIN — EPHEDRINE SULFATE 5 MG: 50 INJECTION INTRAVENOUS at 09:21:00

## 2024-10-08 NOTE — CM/SW NOTE
Department  notified of request for HHC, aidin referrals started. Assigned CM/SW to follow up with pt/family on further discharge planning.     Jennifer Muller, DSC

## 2024-10-08 NOTE — ANESTHESIA PROCEDURE NOTES
Airway  Date/Time: 10/8/2024 8:04 AM  Urgency: elective      General Information and Staff    Patient location during procedure: OR  Anesthesiologist: Casandra Dobbs MD  Performed: anesthesiologist   Performed by: Catia Brambila MD  Authorized by: Catia Brambila MD      Indications and Patient Condition  Indications for airway management: anesthesia  Spontaneous ventilation: present  Sedation level: deep  Preoxygenated: yes  Patient position: patient lateral.  Mask difficulty assessment: 1 - vent by mask    Final Airway Details  Final airway type: endotracheal airway      Successful airway: ETT  Cuffed: yes   Successful intubation technique: Video laryngoscopy  Facilitating devices/methods: intubating stylet  Blade type: Kirby.  Blade size: #3  ETT size (mm): 7.5    Cormack-Lehane Classification: grade IIA - partial view of glottis  Measured from: lips  ETT to lips (cm): 22  Number of attempts at approach: 1  Number of other approaches attempted: 0    Additional Comments  Patient induced with propofol/sux, easy MV in lateral position. Kirby used to visualize VC apparatus, Grade II- view in this position, Dr. Dobbs able to place ETT successfully on first attempt.

## 2024-10-08 NOTE — ANESTHESIA PREPROCEDURE EVALUATION
Anesthesia PreOp Note    HPI:     Samira Nicolas is a 69 year old female who presents for preoperative consultation requested by: Joseluis De La Paz MD    Date of Surgery: 10/8/2024    Procedure(s):  Left total hip arthroplasty  Indication: Degenerative joint disease, left hip    Relevant Problems   No relevant active problems       NPO:  Last Liquid Consumption Date: 10/07/24  Last Liquid Consumption Time: 2000  Last Solid Consumption Date: 10/07/24  Last Solid Consumption Time: 1600  Last Liquid Consumption Date: 10/07/24       Last ozempic 2 weeks ago, gerd well controlled. Per patient she says she has a humerus fracture right side, 2 years ago, did not have surgical repair. She is concerned about pain and positioning. Will discuss with orthopedic surgeon.        History Review:  Patient Active Problem List    Diagnosis Date Noted    Asthma, extrinsic ([facility]) 10/04/2024    Pure hypercholesterolemia 10/04/2024    Type 2 diabetes mellitus without complication, without long-term current use of insulin ([facility]) 10/04/2024    Primary osteoarthritis of one hip, left 10/04/2024    Irritable bowel syndrome with diarrhea 07/14/2020    GRACY (obstructive sleep apnea) 10/18/2017    Vitamin D deficiency 08/03/2017    Acquired hypothyroidism 01/27/2016    HTN (hypertension), benign 10/02/2012       Past Medical History:    Anesthesia complication    slow to wake post-op    Anxiety state    Arthritis    Asthma (HCC)    mild- due year round allergies    Disorder of thyroid    hypothyroidism    Esophageal reflux    High blood pressure    High cholesterol    IBS (irritable bowel syndrome)    Osteoarthritis    PONV (postoperative nausea and vomiting)    Renal disorder    virus that spills protien    Sleep apnea    does not use CPAP due to weight loss    Type 2 Diabetes       Past Surgical History:   Procedure Laterality Date    Cholecystectomy      Colonoscopy      Hip replacement surgery Right 2016       Medications Prior to Admission    Medication Sig Dispense Refill Last Dose    fexofenadine 180 MG Oral Tab Take 1 tablet (180 mg total) by mouth daily.   10/7/2024    furosemide 20 MG Oral Tab Take 1 tablet (20 mg total) by mouth daily.   10/7/2024    levothyroxine 50 MCG Oral Tab Take 1 tablet (50 mcg total) by mouth daily.   10/7/2024    carvedilol 3.125 MG Oral Tab Take 1 tablet (3.125 mg total) by mouth Q12H.   10/8/2024    enalapril 20 MG Oral Tab Take 1 tablet (20 mg total) by mouth every morning.   10/8/2024    FLUoxetine 20 MG Oral Cap Take 1 capsule (20 mg total) by mouth daily.   10/7/2024    cholecalciferol 125 MCG (5000 UT) Oral Tab Take 1 tablet (5,000 Units total) by mouth daily.   10/7/2024    metFORMIN 500 MG Oral Tab Take 1 tablet (500 mg total) by mouth 2 (two) times daily with meals.   10/8/2024    atorvastatin 10 MG Oral Tab Take 1 tablet (10 mg total) by mouth nightly.   10/7/2024    montelukast 10 MG Oral Tab Take 1 tablet (10 mg total) by mouth nightly.   10/7/2024    Potassium Chloride ER 10 MEQ Oral Tab CR Take 1 tablet (10 mEq total) by mouth daily.   10/7/2024    omeprazole 20 MG Oral Capsule Delayed Release Take 1 capsule (20 mg total) by mouth every morning.   10/7/2024    semaglutide (OZEMPIC, 2 MG/DOSE,) 8 MG/3ML Subcutaneous Solution Pen-injector Inject 2 mg into the skin once a week.   9/29/2024     Current Facility-Administered Medications Ordered in Epic   Medication Dose Route Frequency Provider Last Rate Last Admin    lactated ringers infusion   Intravenous Continuous Joseluis De La Paz MD 20 mL/hr at 10/08/24 0621 New Bag at 10/08/24 0621    metoprolol tartrate (Lopressor) tab 25 mg  25 mg Oral Once PRN Joseluis De La Paz MD        ceFAZolin (Ancef) 2g in 10mL IV syringe premix  2 g Intravenous Once Joseluis De La Paz MD        clonidine-EPINEPHrine-ropivacaine-ketorolac (CERTS) (Duraclon-Adrenalin-Naropin-Toradol) pain cocktail irrigation   Intra-articular Once (Intra-Op) Joseluis De La Paz MD         No  current Epic-ordered outpatient medications on file.       Allergies   Allergen Reactions    Latex RASH    Adhesive Tape RASH     Rash noted with clear tape, tolerating tegaderm well    Cefdinir NAUSEA ONLY       Family History   Problem Relation Age of Onset    Other (lung ca) Father     Brain Cancer Mother     Other (lung ca) Mother     Other (HR2) Sister     Other (stomach tummor) Brother      Social History     Socioeconomic History    Marital status:    Tobacco Use    Smoking status: Former     Types: Cigarettes     Passive exposure: Never    Smokeless tobacco: Never    Tobacco comments:     Quit 2014   Vaping Use    Vaping status: Never Used   Substance and Sexual Activity    Alcohol use: Not Currently     Alcohol/week: 1.0 standard drink of alcohol     Types: 1 Glasses of wine per week    Drug use: Never       Available pre-op labs reviewed.  Lab Results   Component Value Date    WBC 8.4 09/27/2024    RBC 3.73 (L) 09/27/2024    HGB 12.3 09/27/2024    HCT 36.1 09/27/2024    MCV 96.8 09/27/2024    MCH 33.0 09/27/2024    MCHC 34.1 09/27/2024    RDW 11.9 09/27/2024    .0 09/27/2024     Lab Results   Component Value Date     (L) 09/27/2024    K 4.5 09/27/2024     09/27/2024    CO2 29.0 09/27/2024    BUN 11 09/27/2024    CREATSERUM 1.01 09/27/2024    GLU 99 09/27/2024    PGLU 136 (H) 10/08/2024    CA 10.0 09/27/2024          Vital Signs:  Body mass index is 33.28 kg/m².   height is 1.651 m (5' 5\") and weight is 90.7 kg (200 lb). Her oral temperature is 97.5 °F (36.4 °C). Her blood pressure is 139/59 and her pulse is 57. Her respiration is 16 and oxygen saturation is 96%.   Vitals:    09/24/24 1317 10/08/24 0552   BP:  139/59   Pulse:  57   Resp:  16   Temp:  97.5 °F (36.4 °C)   TempSrc:  Oral   SpO2:  96%   Weight: 90.7 kg (200 lb)    Height: 1.651 m (5' 5\")         Anesthesia Evaluation     Patient summary reviewed and Nursing notes reviewed    No history of anesthetic complications (pt  denies hx of anesthesia complications)   Airway   Mallampati: II  TM distance: >3 FB  Neck ROM: full  Dental      Pulmonary - normal exam   (+) asthma, sleep apnea  Cardiovascular - normal exam  Exercise tolerance: good  (+) hypertension    Neuro/Psych    (+)  anxiety/panic attacks,        GI/Hepatic/Renal    (+) GERD well controlled    Endo/Other    (+) diabetes mellitus type 2 well controlled  Abdominal  - normal exam                 Anesthesia Plan:   ASA:  3  Plan:   Spinal  Plan Comments: Plan spinal anesthesia with GETA backup. Patient aware of risks/benefits, including infection, bleeding, block failure, conversion to GETA.   Informed Consent Plan and Risks Discussed With:  Patient      I have informed Samira Nicolas and/or legal guardian or family member of the nature of the anesthetic plan, benefits, risks including possible dental damage if relevant, major complications, and any alternative forms of anesthetic management.   All of the patient's questions were answered to the best of my ability. The patient desires the anesthetic management as planned.  Catia Brambila MD  10/8/2024 6:39 AM  Present on Admission:   Primary osteoarthritis of one hip, left   Acquired hypothyroidism   Asthma, extrinsic (HCC)   HTN (hypertension), benign   Irritable bowel syndrome with diarrhea   GRACY (obstructive sleep apnea)   Pure hypercholesterolemia   Type 2 diabetes mellitus without complication, without long-term current use of insulin (HCC)   Vitamin D deficiency

## 2024-10-08 NOTE — PROGRESS NOTES
[facility]  part of [facility]    Ortho Medical Progress Note     Samira Nicolas Patient Status:  Outpatient in a Bed    1955 MRN [id number]   Location [facility] Attending Joseluis De La Paz MD   Hosp Day # 0 PCP MISHEL CHURCHILL       Subjective:   Samira Nicolas is a(n) 69 year old female post operative left hip arthroplasty per Dr. De La Paz post op day # 0.  Denies nausea and vomiting, dizziness.  WBAT.  The pain is controlled after treated.      Objective:   Vital Signs:  Blood pressure 133/61, pulse 60, temperature 97.7 °F (36.5 °C), temperature source Oral, resp. rate 16, height 5' 5\" (1.651 m), weight 200 lb (90.7 kg), SpO2 94%.     General: No acute distress. Appropriate   HEENT: Moist mucous membranes. PERRL  Respiratory: Clear to auscultation bilaterally.  No wheezes. No rhonchi.   Cardiovascular: S1, S2.  Regular rate and rhythm.    Abdomen: Soft, nontender, nondistended.  Positive bowel sounds   Neurologic: Oriented and alert. Sensation present in bilateral lower extremities   :  no aiken  Musculoskeletal: Surgical dressing dry and intact.  No calf tenderness.  Provena vac in place  Skin: No lesions. No erythema. Color normal  Psychiatric: Appropriate mood and affect.      Assessment and Plan:     Pre op labs reviewed prior to the procedure        Primary osteoarthritis of one hip, left  POD #0 stable      Acquired hypothyroidism        Asthma, extrinsic (HCC)        HTN (hypertension), benign        Irritable bowel syndrome with diarrhea        GRACY (obstructive sleep apnea)        Pure hypercholesterolemia        Type 2 diabetes mellitus without complication, without long-term current use of insulin ([facility]) - metformin and sliding scale while inpatient        Vitamin D deficiency        Osteoarthritis of left hip              XR HIP W OR WO PELVIS 1 VIEW, LEFT (CPT=73501)    Result Date: 10/8/2024  CONCLUSION:   Immediate postoperative changes after arthroplasty.  No  acute fracture.       Dictated by (CST): Jossy Miner MD on 10/08/2024 at 11:34 AM     Finalized by (CST): Jossy Miner MD on 10/08/2024 at 11:36 AM                 PT/OT: per ortho  Pain Control: per ortho   Incentive Spirometry  DVT Prophylaxis   GI Prophylaxis   Home medications reconciled     The patient was discussed with Dr. Costello.  We are following for medical assessment by monitoring respiratory status, hemodynamics, GI status, reviewing labs, and assisting with anticoagulation monitoring as patient appropriate.          Is this a shared or split note between Advanced Practice Provider and Physician? No     TONY Dhillon  Office [phone]  Cell [phone]  10/8/2024

## 2024-10-08 NOTE — PROGRESS NOTES
Patient has history of unclear fracture to R humerus 2+ years ago with mild lingering pain. Patients reports she has good function of RUE with adequate ROM and strength to complete ADLs and do activities she enjoys. On exam, nontender over R humerus and tolerates forward elevation to 80 degrees. No neurologic deficits to RUE on exam. This is a chronic issue and we are okay to proceed with lateral decubitus positioning for KENN.    Deon Olsen MD  Adult Reconstruction Fellow  [facility]

## 2024-10-08 NOTE — OPERATIVE REPORT
OPERATIVE REPORT     PROCEDURE DATE: 10/8/2024     SURGEON: Joseluis De La Paz MD      ASSISTANT SURGEON: Deon Olsen MD  (No qualified resident was available to assist with this case; we will thus bill for fellow)     PREOPERATIVE DIAGNOSIS: Left hip degenerative joint disease     POSTOPERATIVE DIAGNOSIS: Left hip degenerative joint disease    PROCEDURE: Left total hip arthroplasty    ANESTHESIA: Spinal with conversion to general    PREOPERATIVE ANTIBIOTICS: Ancef 2 g IV within 60 minutes of procedure start.     ESTIMATED BLOOD LOSS: 150 mL.       IMPLANTS   1. Zack G7 cup 54mm  2. 6.5 mm bone screws x2  3. Zack G7 Vivacit-E Vitamin E Highly Crosslinked Polyethylene liner, Neutral, 40mm  4. Zack M/L taper femoral stem, standard offset, size 10  5. Zack Biolox Delta ceramic femoral head, 40 mm, +0, 12-14 taper.     SPECIMENS  Femoral head, acetabular reamings    COMPLICATIONS   None apparent.     FINDINGS   Consistent with end-stage left hip degenerative joint disease.     INDICATIONS   Samira Nicolas is a 69 year old female who has been followed by the orthopedic surgery service for the above diagnosis. Samira Nicolas was previously seen and evaluated in the orthopedic clinic and diagnosed with hip degenerative joint disease. After nonoperative treatment measures such as physical therapy, activity modification, weight loss, and NSAIDs failed to improved the patient's symptoms, Samira Nicolas wished to proceed with surgery and was therefore scheduled for the above-described procedure on an elective basis.     TECHNIQUE   Samira Nicolas was identified and greeted in the preoperative holding area and was identified using medical record number, name, and date of birth, all of which were confirmed. The operative hip was marked using an indelible marker and informed consent was verbally confirmed. Written informed consent was obtained, as well.. Once again, all risks and benefits as well as alternatives to surgical  intervention were discussed with the patient in detail and all the questions were answered. Risks discussed included but were not limited to pain, infection, bleeding, scarring, stiffness, thromboembolic events, severe limb dysfunction, loss of limb, and loss of life. The patient verbally confirmed the consent and wished to proceed with surgery as scheduled.     The anesthesia service then proceeded with anesthesia and Samira Nicolas was taken to the operating room and placed on the operating table in the lateral decubitus position. Care was taken to pad all bony prominences well. The patient was locked in the lateral decubitus position using the standard positioners. The lower extremity was then prepped and draped in a standard orthopedic fashion. A preprocedural timeout was then performed once again confirming the patient's correct identity, correct procedure, correct side, and correct site. In addition, allergy status and required equipment were reviewed. Three independent members of the operating room team agreed on the above-mentioned parameters.     The ASIS was identified and approximately 2-3 cm posterior to it on the illiac crest a percutaneous stab incision was made and a bang pin was placed.  A second incision was also made and a second bang pin was placed.  The hip navigation Evaneosijoint platform was attached to the two pins.  Next, A standard posterior approach to the operative hip was then performed in the usual fashion. Once the gluteus elizabeth fascia was identified, it was split in line with its fibers under careful hemostasis using a Bovie. The tip of the trochanter was then identified and a standard arthrotomy was performed, traveling from quadratus femoris in line with the gluteus medius fibers proximally. The posterior hip capsule was tacked using #1 Ethibond suture.  Prior to hip dislocation, the intellijoint button was secured to the greater trochanter with 1 screw and the center of hip  rotation was determined from the intellijoint system.  Next,  the hip was dislocated without complications. The saddle point of the femur and the lesser trochanter were directly palpated and visualized and freed of any interposed tissues. A standard neck cut was then performed according to preoperative templating using an oscillating saw and an osteotome.     The femur was then placed anteriorly to the acetabulum and retracted using an anterior retractor. A second retractor was placed on the posterior inferior aspect of the acetabulum. Next, any remaining labral remnants and scar tissue were carefully removed circumferentially from the acetabulum. The cotyloid fossa was   cleared out of any soft tissues under careful hemostasis using a Bovie. Sequential reaming was then performed. Trialing was then performed which was found to fit the patient's anatomy well and abduction, anteversion and offset were all confirmed with the intellijoint system. Accordingly, an appropriately sized Zack G7 acetabular component was then placed and impacted into place using gentle mallet blows.   It was secured in place using two 6.5 mm bone screws. A poly trial was then placed.     Attention was then turned to the femur and box osteotome was used to lateralize and a canal finder was used to obtain the adequate trajectory. Sequential broaching was then performed using a Zack M/L taper broaches. Trialing was then performed and the components were found to achieve excellent stability. Leg length was found to be equal. Accordingly, all trials and the femoral broach were removed.     The definitive Zack G7 liner was placed and impacted using gentle mallet blows. Once its adequate position was confirmed, attention was turned back to the femur and the above documented Zack M/L taper was impacted into the proximal femur. Care was taken to avoid any calcar fractures. Trialing was once again commenced and excellent stability was achieved.  Accordingly, the head was exchanged for the Biolox ceramic head specifically documented above. The hip was once again reduced and final   time taken through range of motion with adequate stability and offset, anteversion and abduction again confirmed using the [employer] navigation system.  The two steimann pinns as well as the trochanter button and screw were all removed and accounted for prior to closure.     The hip was then irrigated, and the posterior capsule was carefully closed using previously placed tag sutures. It was sutured into the gluteus medius tendon and   its insertion at the greater trochanter. A tight capsular repair was achieved. The hip was then once again irrigated using normal saline and closed in a layered fashion. The gluteus elizabeth fascia was closed using running quill suture and a local anesthetic cocktail   was injected into the fascia and subcutaneous tissue circumferentially roz-incisionally. Subcutaneous closure was achieved using #1 and 2-0 Vicryl in a simple interrupted   suture-type fashion and the skin was closed using staples. Skin was dressed using a PREVENA dressing.     The patient was then rolled back into the supine position, transferred onto a regular bed and brought to PACU in stable condition.At the end of the procedure, all sponge, needle, instrument, and scalpel counts were performed and found to be correct.     Attending physician Dr. De La Paz was scrubbed and present for all key parts of the procedure. He supervised the entire procedure.       POSTOPERATIVE PLAN  -admit to orthopaedics  -pain control  -DVT ppx: mechanical +  BID  -WBAT, posterior hip precautions  -PT on POD #0  -Medicine co-management        Deon Olsen MD  Fellow - Adult Reconstruction  Department of Orthopaedic Surgery  10/8/2024  9:57 AM

## 2024-10-08 NOTE — PHYSICAL THERAPY NOTE
PHYSICAL THERAPY HIP EVALUATION - INPATIENT     Room Number: Room 3/Room 3-A  Evaluation Date: 10/8/2024  Type of Evaluation: Initial  Physician Order: PT Eval and Treat    Presenting Problem: s/p L KENN on 10/8     Reason for Therapy: Mobility Dysfunction and Discharge Planning    PHYSICAL THERAPY ASSESSMENT   Patient is a 69 year old female admitted 10/8/2024 for L KENN.  Prior to admission, patient's baseline is independent with ADLs and functional mobility with use of a cane or RW.  Patient is currently functioning below baseline with bed mobility, transfers, gait, stair negotiation, standing prolonged periods, and performing household tasks.  Patient is requiring contact guard assist as a result of the following impairments: decreased functional strength, pain, impaired dynamic standing balance, decreased muscular endurance, medical status, and limited L hip ROM due to posterior hip precautions.  Physical Therapy will continue to follow for duration of hospitalization.    Patient will benefit from continued skilled PT Services at discharge to promote prior level of function and safety with additional support and return home with home health PT.    PLAN DURING HOSPITALIZATION  Nursing Mobility Recommendation : 1 Assist  PT Device Recommendation: Rolling walker  PT Treatment Plan: Bed mobility;Body mechanics;Energy conservation;Endurance;Patient education;Gait training;Strengthening;Stair training;Transfer training;Balance training  Rehab Potential : Good  Frequency (Obs): BID     PHYSICAL THERAPY MEDICAL/SOCIAL HISTORY     Problem List  Principal Problem:    Primary osteoarthritis of one hip, left  Active Problems:    Acquired hypothyroidism    Asthma, extrinsic (HCC)    HTN (hypertension), benign    Irritable bowel syndrome with diarrhea    GRACY (obstructive sleep apnea)    Pure hypercholesterolemia    Type 2 diabetes mellitus without complication, without long-term current use of insulin (HCC)    Vitamin D  deficiency    Osteoarthritis of left hip      HOME SITUATION  Type of Home: House  Home Layout: Two level;Bed/bath upstairs  Stairs to Bedroom: 13    Railing: Yes    Lives With: Spouse    Drives: Yes   Patient Regularly Uses: None     Prior Level of [place]: independent, ambulates with cane or RW     SUBJECTIVE  Agreeable to activity.     PHYSICAL THERAPY EXAMINATION   OBJECTIVE  Precautions: Hip abduction pillow;KENN - posterior  Fall Risk: Standard fall risk    WEIGHT BEARING RESTRICTION  L Lower Extremity: Weight Bearing as Tolerated    PAIN ASSESSMENT  Rating:  (did not rate)  Location: left hip  Management Techniques: Activity promotion;Body mechanics;Breathing techniques;Repositioning (ice)    COGNITION  Overall Cognitive Status:  WFL - within functional limits    RANGE OF MOTION AND STRENGTH ASSESSMENT  Upper extremity ROM and strength are within functional limits.  Lower extremity ROM is within functional limits.  Lower extremity strength is within functional limits.    BALANCE  Static Sitting: Good  Dynamic Sitting: Fair +  Static Standing: Fair  Dynamic Standing: Fair -    AM-PAC '6-Clicks' INPATIENT SHORT FORM - BASIC MOBILITY  How much difficulty does the patient currently have...  Patient Difficulty: Turning over in bed (including adjusting bedclothes, sheets and blankets)?: A Little   Patient Difficulty: Sitting down on and standing up from a chair with arms (e.g., wheelchair, bedside commode, etc.): A Little   Patient Difficulty: Moving from lying on back to sitting on the side of the bed?: A Little   How much help from another person does the patient currently need...   Help from Another: Moving to and from a bed to a chair (including a wheelchair)?: A Little   Help from Another: Need to walk in hospital room?: A Little   Help from Another: Climbing 3-5 steps with a railing?: A Little     AM-PAC Score:  Raw Score: 18   Approx Degree of Impairment: 46.58%   Standardized Score (AM-PAC Scale): 43.63    CMS Modifier (G-Code): CK    FUNCTIONAL ABILITY STATUS  Functional Mobility/Gait Assessment  Gait Assistance: Contact guard assist  Distance (ft): 15 + 60  Assistive Device: Rolling walker  Pattern: Shuffle;L Decreased stance time (slow pace, step-to pattern, VC to slow down pace as pt is slightly impulsive)  Supine to Sit: stand-by assist  Sit to Stand: contact guard assist    Exercise/Education Provided:  Education Provided To: Patient     Patient Education: Role of Physical Therapy;Plan of Care;DME Recommendations;Discharge Recommendations;Functional Transfer Techniques;Fall Prevention;Weight Bear Status;Surgical Precautions;Posture/Positioning;Edema Reduction;Energy Conservation;Proper Body Mechanics;LE HEP for ROM;Gait Training (use of hip abductor pillow, safe sleep position, posterior hip precautions, WBAT, benefits of frequent ambulation/OOB mobility)     Patient's Response to Education: Returned Demonstration;Verbalized Understanding;Requires Further Education     Skilled Therapy Provided: Pt received resting in bed; introduced self and role; educated on the above. Pt agreeable to activity. C/o mild pain of L hip. Demos bed mobility with SBA. Demos STS transfer to/from bed, toilet, and chair with RW and CGA, with VC for safe hand placement and body mechanics. Ambulated in hallway with RW and CGA. Returned to room and was left sitting in chair with ice applied to hip, needs within reach, handoff to RN complete.     The patient's Approx Degree of Impairment: 46.58% has been calculated based on documentation in the [facility] '6 clicks' Inpatient Basic Mobility Short Form.  Research supports that patients with this level of impairment may benefit from home with HH PT.  Final disposition will be made by interdisciplinary medical team.    Patient End of Session: Up in chair;Needs met;Call light within reach;RN aware of session/findings;All patient questions and concerns addressed;Hospital anti-slip socks;Ice  applied    CURRENT GOALS  Goals to be met by: 10/16/24  Patient Goal Patient's self-stated goal is: go home    Goal #1 Patient is able to demonstrate supine - sit EOB @ level: supervision    Goal #1   Current Status    Goal #2 Patient is able to demonstrate transfers Sit to/from Stand at assistance level: supervision      Goal #2  Current Status    Goal #3 Patient is able to ambulate 150 feet with assistive device at assistance level: supervision    Goal #3   Current Status    Goal #4 Patient will negotiate 10 stairs/one curb w/ assistive device and supervision   Goal #4   Current Status    Goal #5 Patient verbalizes and/or demonstrates all precautions and safety concerns independently   Goal #5   Current Status    Goal #6 Patient independently performs home exercise program for ROM/strengthening per the instructions provided in preparation for discharge.   Goal #6  Current Status      Patient Evaluation Complexity Level:  History Low - no personal factors and/or co-morbidities   Examination of body systems Low -  addressing 1-2 elements   Clinical Presentation Low- Stable   Clinical Decision Making  Low Complexity     Therapeutic Activity:  20 minutes

## 2024-10-08 NOTE — ANESTHESIA PROCEDURE NOTES
Spinal Block    Date/Time: 10/8/2024 7:14 AM    Performed by: Catia Brambila MD  Authorized by: Catia Brambila MD      General Information and Staff    Start Time:  10/8/2024 7:14 AM  End Time:  10/8/2024 7:38 AM  Anesthesiologist:  Catia Brambila MD  Performed by:  Anesthesiologist  Patient Location:  OR  Reason for Block: at surgeon's request and surgical anesthesia      Procedure Details    Patient Position:  Sitting  Prep: ChloraPrep    Monitoring:  Heart rate, cardiac monitor and continuous pulse ox  Approach:  Midline  Location:  L3-4  Injection Technique:  Single-shot    Needle    Needle Type:  Tuohy  Needle Gauge:  25 G  Needle Length:  6 in  Needle Insertion Depth:  5    Assessment    Sensory Level:  T10  Events: CSF aspirated and difficulty      Additional Comments     Patient prepped and draped in usual sterile fashion. Difficulty entering spinal space with standard kit needle, +bone encountered. Used Touhy to obtain MARGARITO at L3-4, long spinal needle used to enter IT space, +CSF, medication injected with decrease in BP immediately. Patient positioned for surgery.

## 2024-10-08 NOTE — INTERVAL H&P NOTE
Pre-op Diagnosis: Degenerative joint disease, left hip    The above referenced H&P was reviewed by Deon Olsen MD on 10/8/2024, the patient was examined and no significant changes have occurred in the patient's condition since the H&P was performed.  I discussed with the patient and/or legal representative the potential benefits, risks and side effects of this procedure; the likelihood of the patient achieving goals; and potential problems that might occur during recuperation.  I discussed reasonable alternatives to the procedure, including risks, benefits and side effects related to the alternatives and risks related to not receiving this procedure.  We will proceed with procedure as planned.

## 2024-10-08 NOTE — CM/SW NOTE
[facility] has been reserved in [facility].  Orders and face to face to be sent when entered by APN.      CHARITY Ramos ext [phone]

## 2024-10-09 VITALS
SYSTOLIC BLOOD PRESSURE: 141 MMHG | DIASTOLIC BLOOD PRESSURE: 52 MMHG | WEIGHT: 200 LBS | RESPIRATION RATE: 20 BRPM | TEMPERATURE: 98 F | HEIGHT: 65 IN | HEART RATE: 68 BPM | BODY MASS INDEX: 33.32 KG/M2 | OXYGEN SATURATION: 99 %

## 2024-10-09 LAB
ANION GAP SERPL CALC-SCNC: 6 MMOL/L (ref 0–18)
BUN BLD-MCNC: 22 MG/DL (ref 9–23)
BUN/CREAT SERPL: 15.1 (ref 10–20)
CALCIUM BLD-MCNC: 8.7 MG/DL (ref 8.7–10.4)
CHLORIDE SERPL-SCNC: 103 MMOL/L (ref 98–112)
CO2 SERPL-SCNC: 25 MMOL/L (ref 21–32)
CREAT BLD-MCNC: 1.46 MG/DL
EGFRCR SERPLBLD CKD-EPI 2021: 38 ML/MIN/1.73M2 (ref 60–?)
GLUCOSE BLD-MCNC: 161 MG/DL (ref 70–99)
GLUCOSE BLDC GLUCOMTR-MCNC: 177 MG/DL (ref 70–99)
HCT VFR BLD AUTO: 28.7 %
HGB BLD-MCNC: 9.7 G/DL
OSMOLALITY SERPL CALC.SUM OF ELEC: 285 MOSM/KG (ref 275–295)
POTASSIUM SERPL-SCNC: 4.6 MMOL/L (ref 3.5–5.1)
SODIUM SERPL-SCNC: 134 MMOL/L (ref 136–145)

## 2024-10-09 PROCEDURE — 85014 HEMATOCRIT: CPT | Performed by: STUDENT IN AN ORGANIZED HEALTH CARE EDUCATION/TRAINING PROGRAM

## 2024-10-09 PROCEDURE — 82962 GLUCOSE BLOOD TEST: CPT

## 2024-10-09 PROCEDURE — 85018 HEMOGLOBIN: CPT | Performed by: STUDENT IN AN ORGANIZED HEALTH CARE EDUCATION/TRAINING PROGRAM

## 2024-10-09 PROCEDURE — 97116 GAIT TRAINING THERAPY: CPT

## 2024-10-09 PROCEDURE — 80048 BASIC METABOLIC PNL TOTAL CA: CPT | Performed by: STUDENT IN AN ORGANIZED HEALTH CARE EDUCATION/TRAINING PROGRAM

## 2024-10-09 PROCEDURE — 97165 OT EVAL LOW COMPLEX 30 MIN: CPT

## 2024-10-09 PROCEDURE — 97530 THERAPEUTIC ACTIVITIES: CPT

## 2024-10-09 PROCEDURE — 97535 SELF CARE MNGMENT TRAINING: CPT

## 2024-10-09 RX ORDER — MELOXICAM 15 MG/1
15 TABLET ORAL DAILY
Status: SHIPPED | COMMUNITY
Start: 2024-10-09

## 2024-10-09 RX ORDER — GABAPENTIN 100 MG/1
200 CAPSULE ORAL 3 TIMES DAILY
Status: DISCONTINUED | OUTPATIENT
Start: 2024-10-09 | End: 2024-10-09

## 2024-10-09 RX ORDER — ACETAMINOPHEN 500 MG
1000 TABLET ORAL EVERY 8 HOURS SCHEDULED
Status: SHIPPED | COMMUNITY
Start: 2024-10-09

## 2024-10-09 RX ORDER — TRAMADOL HYDROCHLORIDE 50 MG/1
50 TABLET ORAL EVERY 6 HOURS SCHEDULED
Status: SHIPPED | COMMUNITY
Start: 2024-10-09

## 2024-10-09 RX ORDER — GABAPENTIN 100 MG/1
200 CAPSULE ORAL 3 TIMES DAILY
Status: SHIPPED | COMMUNITY
Start: 2024-10-09

## 2024-10-09 NOTE — PHYSICAL THERAPY NOTE
PHYSICAL THERAPY HIP TREATMENT NOTE - INPATIENT    Room Number: Room 3/Room 3-A            Presenting Problem: s/p L KENN on 10/8  Co-Morbidities : Hx DM2, HTN    Problem List  Principal Problem:    Osteoarthritis of left hip  Active Problems:    Acquired hypothyroidism    Asthma, extrinsic (HCC)    HTN (hypertension), benign    GRACY (obstructive sleep apnea)    Pure hypercholesterolemia    Type 2 diabetes mellitus without complication, without long-term current use of insulin (HCC)    S/P hip replacement, left      PHYSICAL THERAPY ASSESSMENT   Patient demonstrates good  progress this session, goals  remain in progress.      Patient is requiring contact guard assist and minimal assist as a result of the following impairments: decreased functional strength, decreased endurance/aerobic capacity, and pain.     Patient continues to function below baseline with bed mobility, transfers, and gait.  Next session anticipate patient to progress bed mobility, transfers, and gait.  Physical Therapy will continue to follow patient for duration of hospitalization.    Patient continues to benefit from continued skilled PT services: at discharge to promote prior level of function.  Anticipate patient will return home with home health PT.    PLAN DURING HOSPITALIZATION  Nursing Mobility Recommendation : 1 Assist  PT Device Recommendation: Rolling walker  PT Treatment Plan: Bed mobility;Body mechanics;Endurance;Gait training  Frequency (Obs): Daily     SUBJECTIVE  Pt  reports being ready for PT RX    OBJECTIVE  Precautions: Hip abduction pillow    WEIGHT BEARING RESTRICTION  L Lower Extremity: Weight Bearing as Tolerated      PAIN ASSESSMENT   Ratin  Location: left hip  Management Techniques: Activity promotion;Body mechanics;Breathing techniques;Repositioning (ice)    BALANCE  Static Sitting: Good  Dynamic Sitting: Fair +  Static Standing: Fair  Dynamic Standing: Fair -  ACTIVITY TOLERANCE                         O2 WALK        AM-PAC '6-Clicks' INPATIENT SHORT FORM - BASIC MOBILITY  How much difficulty does the patient currently have...  Patient Difficulty: Turning over in bed (including adjusting bedclothes, sheets and blankets)?: A Little   Patient Difficulty: Sitting down on and standing up from a chair with arms (e.g., wheelchair, bedside commode, etc.): A Little   Patient Difficulty: Moving from lying on back to sitting on the side of the bed?: A Little   How much help from another person does the patient currently need...   Help from Another: Moving to and from a bed to a chair (including a wheelchair)?: A Little   Help from Another: Need to walk in hospital room?: A Little   Help from Another: Climbing 3-5 steps with a railing?: A Little     AM-PAC Score:  Raw Score: 18   Approx Degree of Impairment: 46.58%   Standardized Score (AM-PAC Scale): 43.63   CMS Modifier (G-Code): CK    FUNCTIONAL ABILITY STATUS  Functional Mobility/Gait Assessment  Gait Assistance: Contact guard assist  Distance (ft): 2 x 100  Assistive Device: Rolling walker  Pattern: L Decreased stance time  Stairs: Stairs  How Many Stairs: 12  Device: 2 Rails  Assist: Contact guard assist  Pattern: Ascend and Descend  Rolling: minimal assist  Supine to Sit: minimal assist  Sit to Supine: minimal assist  Sit to Stand: minimal assist    Skilled Therapy Provided: 2nd  session.Min a for bed mobility and transfer;extra time provided to complete task.EOB sitting balance activity with emphasis on core stabilization.Pt educated on deep breathing and relaxation technique.Pt amb 2 x 100 ft with RW and CGA;provided cuing for gait pattern as well as for postural awareness.Navigated 12 stairs with CGA.There ex.    The patient's Approx Degree of Impairment: 46.58% has been calculated based on documentation in the [facility] '6 clicks' Inpatient Basic Mobility Short Form.  Research supports that patients with this level of impairment may benefit from home with home health PT.  Final  disposition will be made by interdisciplinary medical team.    Exercises AM Session 2nd  Session   Ankle Pumps     20 reps  20 reps   Quad Sets 20 reps 20  reps   Glut Sets 20 reps 20  reps                                             Patient End of Session: Up in chair;Call light within reach;RN aware of session/findings;All patient questions and concerns addressed    CURRENT GOALS     Patient Goal Patient's self-stated goal is: go home    Goal #1 Patient is able to demonstrate supine - sit EOB @ level: supervision    Goal #1   Current Status Min a   Goal #2 Patient is able to demonstrate transfers Sit to/from Stand at assistance level: supervision      Goal #2  Current Status Min a   Goal #3 Patient is able to ambulate 150 feet with assistive device at assistance level: supervision    Goal #3   Current Status Pt amb 2 x 100 ft with RW and CGA   Goal #4 Patient will negotiate 10 stairs/one curb w/ assistive device and supervision   Goal #4   Current Status Navigated   12  stairs with CGA   Goal #5 Patient verbalizes and/or demonstrates all precautions and safety concerns independently   Goal #5   Current Status In progress   Goal #6 Patient independently performs home exercise program for ROM/strengthening per the instructions provided in preparation for discharge.   Goal #6  Current Status In progress     Gait Training: 15 minutes  Therapeutic Activity: 15 minutes  Neuromuscular Re-education:  minutes  Therapeutic Exercise:  minutes  [place] Repositioning:  minutes  Manual Therapy:  minutes  Can add/delete any of these

## 2024-10-09 NOTE — DISCHARGE SUMMARY
Ortho Discharge Summary     Patient ID:  Samira Nicolas  [id number]  69 year old  6/25/1955      Admit Date: 10/8/2024    Discharge Date: 10/9/2024    Attending Physician: Joseluis De La Paz MD     Reason for admission: left hip DJD    Discharge Diagnoses: Degenerative joint disease, left hip  Osteoarthritis of left hip    Discharge Condition: good    History of Present Illness:  Samira Nicolas is a 69 year old female who has been followed by the orthopedic surgery service for the above diagnosis. Samira Nicolas was previously seen and evaluated in the orthopedic clinic and diagnosed with hip degenerative joint disease. After nonoperative treatment measures such as physical therapy, activity modification, weight loss, and NSAIDs failed to improved the patient's symptoms, Samira Nicolas wished to proceed with surgery and was therefore scheduled for the above-described procedure on an elective basis.      All risks, benefits and alternatives for total hip were discussed with the patient and informed consent was obtained for the surgical procedure. The risks discussed included but were not limited to: infection, nerve injury, arterial injury, stiffness, numbness, wear, lysis, need for re-operation, DVT, PE, loss of limb and loss of life. Medical clearance was obtained and there were no contraindications to move forward with surgery on 10/8/2024.      Hospital Course:  The patient was taken to the operating room on the day of admission for Procedure(s): left total hip arthroplasty. The patient tolerated the procedure well and was taken to the post operative room in a stable condition. The patient was noted to be neurovascularly intact post operatively. The post operative films show components in excellent condition.     The patient dressing was checked on post operative day 1 and found to be dry and intact. The gauze  dressing was left in place. Occupational and physical therapy worked with the patient each day and they performed  well. The patient had adequate pain control with oral medication. They were given 24 hours of perioperative antibiotic prophylaxis. The patient was managed in conjunction with the medical team for general medical issues. There were no complications throughout the hospital stay. The patient met physical therapy goals and was discharged.     Joselusi De La Paz MD was aware of all events throughout the hospital course and supervised this patient's care. The discharge of this patient was coordinated with the discharge planner and was discharged on the medication as listed in medical reconciliation.    Consults: ANCILLARY CONSULT TO CASE MANAGEMENT, internal medicine     Disposition: stable, discharge to home with home health       Discharge Medications        START taking these medications        Instructions Prescription details   acetaminophen 500 MG Tabs  Commonly known as: Tylenol Extra Strength      Take 2 tablets (1,000 mg total) by mouth every 8 (eight) hours.   Refills: 0     aspirin 325 MG Tabs      Take 1 tablet (325 mg total) by mouth 2 (two) times daily.   Quantity: 60 tablet  Refills: 0     gabapentin 100 MG Caps  Commonly known as: Neurontin      Take 2 capsules (200 mg total) by mouth 3 (three) times daily.   Refills: 0     Naloxone HCl 4 MG/0.1ML Liqd      4 mg by Nasal route as needed. If patient remains unresponsive, repeat dose in other nostril 2-5 minutes after first dose.   Quantity: 1 kit  Refills: 0     oxyCODONE 5 MG Tabs      Take 1 tablet (5 mg total) by mouth every 4 (four) hours as needed.   Refills: 0     Sennosides 17.2 MG Tabs      Take 1 tablet (17.2 mg total) by mouth nightly.   Refills: 0     traMADol 50 MG Tabs  Commonly known as: Ultram      Take 1 tablet (50 mg total) by mouth every 6 (six) hours.   Refills: 0            CONTINUE taking these medications        Instructions Prescription details   atorvastatin 10 MG Tabs  Commonly known as: Lipitor      Take 1 tablet (10 mg total) by  mouth nightly.   Refills: 0     carvedilol 3.125 MG Tabs  Commonly known as: Coreg      Take 1 tablet (3.125 mg total) by mouth Q12H.   Refills: 0     cholecalciferol 125 MCG (5000 UT) Tabs  Commonly known as: Vitamin D3      Take 1 tablet (5,000 Units total) by mouth daily.   Refills: 0     enalapril 20 MG Tabs  Commonly known as: Vasotec      Take 1 tablet (20 mg total) by mouth every morning.   Refills: 0     fexofenadine 180 MG Tabs  Commonly known as: Allegra      Take 1 tablet (180 mg total) by mouth daily.   Refills: 0     FLUoxetine 20 MG Caps  Commonly known as: PROzac      Take 1 capsule (20 mg total) by mouth daily.   Refills: 0     furosemide 20 MG Tabs  Commonly known as: Lasix      Take 1 tablet (20 mg total) by mouth daily.   Refills: 0     levothyroxine 50 MCG Tabs  Commonly known as: Synthroid      Take 1 tablet (50 mcg total) by mouth daily.   Refills: 0     Meloxicam 15 MG Tabs      Take 1 tablet (15 mg total) by mouth daily.   Refills: 0     metFORMIN 500 MG Tabs  Commonly known as: Glucophage      Take 1 tablet (500 mg total) by mouth 2 (two) times daily with meals.   Refills: 0     omeprazole 20 MG Cpdr  Commonly known as: PriLOSEC      Take 1 capsule (20 mg total) by mouth every morning.   Refills: 0     Ozempic (2 MG/DOSE) 8 MG/3ML Sopn  Generic drug: semaglutide      Inject 2 mg into the skin once a week.   Refills: 0     Potassium Chloride ER 10 MEQ Tbcr      Take 1 tablet (10 mEq total) by mouth daily.   Refills: 0            STOP taking these medications      montelukast 10 MG Tabs  Commonly known as: Singulair                  Where to Get Your Medications        These medications were sent to [id number] - [place], [place] - [address] [facility], [phone], [phone] [address] [facility] [id number]      Phone: [phone]   aspirin 325 MG Tabs  Naloxone HCl 4 MG/0.1ML Liqd           Patient Instructions:     Activity: activity as  tolerated and no driving for 2 weeks    Diet: regular     Wound Care: The Prevena vac dressing is \"waterproof\". You may shower with it in place but the vac itself cannot come in direct contact with water. After the Prevena vac dressing has been removed and replaced with a cotton dressing (ABD), protect the incision with Saran wrap (brand name only) over the dressing and tape occlusively.  The Saran wrap is “water resistant” only.   DO NOT position yourself with the incisional area directly in the stream of water.  After shower, remove the Saran wrap and change your dressing. Change dressing daily.      Home Health Agency:  [facility]    JAN Sage  Nurse Practitioner for Dr. Joseluis De La Paz  P: [phone]

## 2024-10-09 NOTE — PROGRESS NOTES
[facility], [place]  part of [facility]    Progress Note    Samira Nicolas Patient Status:  Outpatient in a Bed    1955 MRN [id number]   Location [facility] Attending No att. providers found   Hosp Day # 0 PCP MISHEL CHURCHILL       Subjective:   Samira Nicolas is a(n) 69 year old female POD# 1 Feels she is doing well. No chest pain, dyspnea or nausea.     Objective:   Vital Signs:  Blood pressure 141/52, pulse 68, temperature 98.1 °F (36.7 °C), temperature source Oral, resp. rate 20, height 5' 5\" (1.651 m), weight 200 lb (90.7 kg), SpO2 99%.     General: No acute distress. Alert and oriented x 3.  HEENT: Moist mucous membranes. EOM-I. PERRL  Neck: No lymphadenopathy.  No JVD. No carotid bruits.  Respiratory: Clear to auscultation bilaterally.  No wheezes. No rhonchi.  Cardiovascular: S1, S2.  Regular rate and rhythm.  No murmurs.  Abdomen: Soft, nontender, nondistended.   Neurologic: No focal neurological deficits.  Musculoskeletal: No calf tenderness.   Integument: No lesions. No erythema.  Psychiatric: Appropriate mood and affect.      Results:    Lab Results   Component Value Date    WBC 8.4 2024    HGB 9.7 (L) 10/09/2024    HCT 28.7 (L) 10/09/2024    .0 2024    CREATSERUM 1.46 (H) 10/09/2024    BUN 22 10/09/2024     (L) 10/09/2024    K 4.6 10/09/2024     10/09/2024    CO2 25.0 10/09/2024     (H) 10/09/2024    CA 8.7 10/09/2024    ALB 4.1 2024    ALKPHO 72 2024    BILT 0.4 2024    TP 6.9 2024    AST 17 2024    ALT 9 (L) 2024         XR HIP W OR WO PELVIS 1 VIEW, LEFT (CPT=73501)    Result Date: 10/8/2024  CONCLUSION:   Immediate postoperative changes after arthroplasty.  No acute fracture.       Dictated by (CST): Jossy Miner MD on 10/08/2024 at 11:34 AM     Finalized by (CST): Jossy Miner MD on 10/08/2024 at 11:36 AM               Assessment and Plan:       Osteoarthritis of left hip        S/P hip  replacement, left  Stable for discharge      HTN (hypertension), benign        GRACY (obstructive sleep apnea)        Pure hypercholesterolemia        Type 2 diabetes mellitus without complication, without long-term current use of insulin (HCC)        Acquired hypothyroidism        Asthma, extrinsic (HCC)        CKD 3A: pt on aspirin for anticoagulation. She is to call her nephrologist for recommendations for anticoagulation                Postoperative Recommendations:  Anticoagulation / DVT prophylaxis: SCDs, early ambulation.  mg twice daily with food for four weeks.  To call nephrologist today for recommendations for anticoagulation recommendations  Pain Control: per ortho  GI protection: Protonix  Incentive Spirometry   Telemetry as needed  CPAP/O2 as needed       Pain management and Physical therapy as per Orthopedic service.   Home Health as needed          Alf Salter MD  10/9/2024

## 2024-10-09 NOTE — PHYSICAL THERAPY NOTE
PHYSICAL THERAPY HIP TREATMENT NOTE - INPATIENT    Room Number: Room 3/Room 3-A            Presenting Problem: s/p L KENN on 10/8  Co-Morbidities : Hx DM2, HTN    Problem List  Principal Problem:    Osteoarthritis of left hip  Active Problems:    Acquired hypothyroidism    Asthma, extrinsic (HCC)    HTN (hypertension), benign    GRACY (obstructive sleep apnea)    Pure hypercholesterolemia    Type 2 diabetes mellitus without complication, without long-term current use of insulin (HCC)    S/P hip replacement, left      PHYSICAL THERAPY ASSESSMENT   Patient demonstrates good  progress this session, goals  remain in progress.      Patient is requiring contact guard assist and minimal assist as a result of the following impairments: decreased functional strength, decreased endurance/aerobic capacity, and pain.     Patient continues to function below baseline with bed mobility, transfers, and gait.  Next session anticipate patient to progress bed mobility, transfers, and gait.  Physical Therapy will continue to follow patient for duration of hospitalization.    Patient continues to benefit from continued skilled PT services: at discharge to promote prior level of function.  Anticipate patient will return home with home health PT.    PLAN DURING HOSPITALIZATION  Nursing Mobility Recommendation : 1 Assist  PT Device Recommendation: Rolling walker  PT Treatment Plan: Bed mobility;Body mechanics;Endurance;Gait training  Frequency (Obs): BID     SUBJECTIVE  Pt  reports being ready for PT RX    OBJECTIVE  Precautions: Hip abduction pillow;KENN - posterior;Limb alert - right    WEIGHT BEARING RESTRICTION  L Lower Extremity: Weight Bearing as Tolerated      PAIN ASSESSMENT   Ratin  Location: left hip  Management Techniques: Activity promotion;Body mechanics;Breathing techniques;Repositioning (ice)    BALANCE  Static Sitting: Good  Dynamic Sitting: Fair +  Static Standing: Fair  Dynamic Standing: Fair -  ACTIVITY TOLERANCE                          O2 WALK       AM-PAC '6-Clicks' INPATIENT SHORT FORM - BASIC MOBILITY  How much difficulty does the patient currently have...  Patient Difficulty: Turning over in bed (including adjusting bedclothes, sheets and blankets)?: A Little   Patient Difficulty: Sitting down on and standing up from a chair with arms (e.g., wheelchair, bedside commode, etc.): A Little   Patient Difficulty: Moving from lying on back to sitting on the side of the bed?: A Little   How much help from another person does the patient currently need...   Help from Another: Moving to and from a bed to a chair (including a wheelchair)?: A Little   Help from Another: Need to walk in hospital room?: A Little   Help from Another: Climbing 3-5 steps with a railing?: A Little     AM-PAC Score:  Raw Score: 18   Approx Degree of Impairment: 46.58%   Standardized Score (AM-PAC Scale): 43.63   CMS Modifier (G-Code): CK    FUNCTIONAL ABILITY STATUS  Functional Mobility/Gait Assessment  Gait Assistance: Contact guard assist  Distance (ft): 2 x 50  Assistive Device: Rolling walker  Pattern: L Decreased stance time;Shuffle  Rolling: minimal assist  Supine to Sit: minimal assist  Sit to Supine: minimal assist  Sit to Stand: minimal assist    Skilled Therapy Provided: Am session.Min a for bed mobility and transfer;extra time provided to complete task.EOB sitting balance activity with emphasis on core stabilization.Pt educated on deep breathing and relaxation technique.Pt amb 2 x 50 ft with RW and CGA;provided cuing for gait pattern as well as for postural awareness.Navigated 4 stairs with CGA.There ex.    The patient's Approx Degree of Impairment: 46.58% has been calculated based on documentation in the [facility] '6 clicks' Inpatient Basic Mobility Short Form.  Research supports that patients with this level of impairment may benefit from home with home health PT.  Final disposition will be made by interdisciplinary medical team.    Exercises AM Session  PM Session   Ankle Pumps     20 reps  reps   Quad Sets 20 reps  reps   Glut Sets 20 reps  reps                                             Patient End of Session: Up in chair;Call light within reach;RN aware of session/findings;All patient questions and concerns addressed    CURRENT GOALS     Patient Goal Patient's self-stated goal is: go home    Goal #1 Patient is able to demonstrate supine - sit EOB @ level: supervision    Goal #1   Current Status Min a   Goal #2 Patient is able to demonstrate transfers Sit to/from Stand at assistance level: supervision      Goal #2  Current Status Min a   Goal #3 Patient is able to ambulate 150 feet with assistive device at assistance level: supervision    Goal #3   Current Status Pt amb 2 x 50 ft with RW and CGA   Goal #4 Patient will negotiate 10 stairs/one curb w/ assistive device and supervision   Goal #4   Current Status Navigated 4  stairs with CGA   Goal #5 Patient verbalizes and/or demonstrates all precautions and safety concerns independently   Goal #5   Current Status In progress   Goal #6 Patient independently performs home exercise program for ROM/strengthening per the instructions provided in preparation for discharge.   Goal #6  Current Status In progress     Gait Training: 15 minutes  Therapeutic Activity: 15 minutes  Neuromuscular Re-education:  minutes  Therapeutic Exercise:  minutes  Canalith Repositioning:  minutes  Manual Therapy:  minutes  Can add/delete any of these

## 2024-10-09 NOTE — DISCHARGE INSTRUCTIONS
Frequently Asked Questions after  Total Hip/Knee Arthroplasty  Dr. Joseluis De La Paz- Surgeon  Sarah Doll - Nurse Practitioner   [facility] -   [phone]    [facility]: [facility] [phone]  If they haven't contacted you already, they should call you once you return home. If you have not had a call by 11am, please call the number above.     When should I follow up with Dr. De La Paz's team?  You should follow up with Dr. De La Paz/ his Nurse Practitioner at 2weeks and 6 weeks after your surgery.  These appointments should have been made at the time you scheduled your surgery, and the dates/times should be in your [facility] information booklet and in your provider follow up.  If you aren't sure about this date, please call our office [phone]. Your     Is swelling normal?  YES! We anticipate swelling, but this can be managed well with ice and elevation.  Please ice/elevate 4 times per day for 30 minutes at a minimum. When elevating please make sure the surgical leg is higher than the heart. A good way to do this is to lay completely flat and put the leg on an arm rest of your couch with a few pillows under the ankle. Please make sure the leg is straight when elevating.   If you are short of breath or have calf pain please call us or go to the ER before elevating the leg.    How should I take care of my incision and dressing?  The gauze dressing should be removed after 7 days unless saturated before then.   If dressing becomes saturated before the 7 day clovis, please remove the gauze dressing sooner.  Stapled incisions: once the gauze dressing is removed, replaced with an ABD pad. Change daily.    How long am I going to be on a blood thinner?  You will be on a blood thinner (warfarin, aspirin, etc) for one month from your surgical date to prevent blood clots. Your medication is specified on your medication list.  If you were taking a blood thinner prior to  surgery, you will continue this per the recommendation of the prescribing doctor.    How often should I have physical therapy after surgery?  If you are going directly to outpatient physical therapy:  Hip replacement- 3x/ week for 2 weeks  If you have home health care, this will be for a total of two weeks  Hip replacement- 3x/ week     How long should I be taking my medications?  Continue to take your blood thinner (ie aspirin, coumadin/ warfarin, lovenox) and pantoprazole for 1 month from surgery.  If you were prescribed an anti-inflammatory medication (celecoxib, meloxicam, ibuprofen etc.), please continue to take this while on your blood thinner. Your pharmacist may instruct you differently. You will be on this for at least 6 weeks.  Continue your stool softener (colace) as long as your are taking narcotic pain medication.    What are the signs of infection I should look for?  It is common for the knee and hip to be red and warm after surgery  Our suspicion for infection rises with any of the followin.  There is pus like discharge from the incision  2.  Your temperature is over 101 degrees  3.  It is painful to move the leg through a gentle range of motion  IF ANY OF THESE THREE OCCUR PLEASE CONTACT OUR OFFICE IMMEDIATELY or if it is after hours, please dial “zero” to talk to the physician on call when you hear the voicemail.      When can I drive?  You can drive when you are off all narcotic pain medications (including Norco, Oxycontin, oxycodone).  You must also feel that you are capable of driving safely; meaning you can push on the gas and brake with force if needed.    When can I shower?  The incision is covered with a cotton dressing (ABD). Please protect the incision with Saran wrap (brand name only) over the dressing and tape occlusively.  The Saran wrap is “water resistant” only.   DO NOT position yourself with the incisional area directly in the stream of water.  After shower, remove the Saran  wrap and change your dressing. Change dressing daily.  If you have a Prevena vac dressing, you will need to cover the dressing AND vac if you are going to shower.   If you have staples, please use Saran Wrap over the surgical wound when showering. It should NOT get wet while staples are in place.  When can I go in a hot tub/bath/pool?  3 months from your surgery if you incision has COMPLETELY healed (no scabs or open areas)    How do I get a refill on my pain medications if necessary before my next appointment?  Oxycodone and Norco (hydrocodone- acetaminophen) require an electronic or physical prescription. Please phone the office at [phone] if you need a refills on medications as these need to be electronically prescribed.   Please understand that the requests will be taken care of as soon as possible but if it is the weekend they may not be refilled until Monday. Please allow 24- 48 hours on refill requests.    How do I get a handicap placard?  We are happy to provide you with ONE handicap placard at the time of your surgery which will be good for six months.  Please ask our office at your pre-op or post-op appointment if you feel you will need a placard.  If you feel you need a handicap placard for longer than 6 months from surgery please contact your primary care physician.    What do I do after home health has discharged me?  We do not routinely prescribe outpatient physical therapy for our hip patients. We will evaluate your progress at your 2 week appointment and determine then if therapy will be necessary. Otherwise, your home health physical therapist should teach you a home exercise program.     If you have any questions related to medical issues unrelated to your knee or hip, please contact the physician that cleared you for surgery:     If you have questions pertaining to the surgery, please feel free to contact our office:   Phone # [phone]  Fax # [phone]    If you have arranged for  outpatient physical therapy upon your discharge from the hospital, you will need to remove the gauze surgical dressing on post operative day 7. You should have received instructions on how to properly do this before you left the hospital.     Dressing change instructions:     Prevena Incision Management System    What is Prevena?  Prevena is a vacuum device that helps your wound heal quickly and protects it from infection. It helps hold the edges of the wound together and removes fluid from the area.    How long do I keep Prevena on incision?  The Prevena stays in place for 7 days. Keep it powered on for 24 hours a day, except when:  -You are bleeding or leaking fluid around the dressing  -The canister is full or broken  -You are removing it    The home health nurse will remove the dressing in 7 days.     Keep Prevena powered on when sleeping but put it where it won't fall or drop on to the ground and where tubing won't kink or pinch.  You can shower but keep the vacuum piece away from direct water streams.     Prevena alarms if:  -There is low battery  -Leak in the system  -Full canister    You can call a Prevena specialist if you have any questions or concerns [phone]      The gauze dressing is to be removed on post-op day 7, unless the dressing is not adhering properly--in which case the dressing is to be changed sooner.  To remove the dressing, see instructions below.      Shower instructions:  Stapled incisions: the incision may NOT get wet in the shower after the gauze has been removed. Please wrap the your surgical wound in Saran Wrap when showering.    Call your surgeon's office if:  The dressing will not stay in place  If there is any skin issue such as tearing or blistering  More than 50% of the dressing becomes saturated  There is a large amount of fluid coming from the incision  You experience unusual pain or odor    Ice/elevate:  Please ice/elevate 4 times per day for 30 minutes at a minimum.   When elevating please make sure the surgical leg is higher than the heart.  A good way to do this is to lay completely flat and put the leg on an arm rest of your couch with a few pillows under the ankle.  Please make sure the leg is straight when elevating.  If you are short of breath or have calf pain please call us or go to the ER before elevating the leg.    Orthopaedic Discharge Bowel Program while on Pain Medications (Opiods)    Docusate sodium (Colace) 100 mg after breakfast and at bedtime.  Polyethylene glycol (Glycolax or Miralax)17 Gm Daily.  Mix well in glass of water.  Hold both docusate sodium (Colace) and polyethylene glycol (Miralax) if greater than three stools per day.  If no stool after two days at home take senna (Senakot) 1-2 tablets at bedtime.  Repeat nightly untill stool occurs.      Continue program as long as continuing opioids.    PLEASE TAKE YOUR MEDICATIONS AS DIRECTED. YOU SHOULD HAVE RECEIVED YOUR PRESCRIPTIONS BEFORE SURGERY:       (Estimated schedule)              -Aspirin 325mg - take 1 tablet by mouth twice daily with food for 4 weeks as directed  (6am, 6pm)     -Acetaminophen 500mg - take 2 tablets by mouth every 8 hours as directed   (6am, 2pm, 10pm)    -[id number] - take 1 tablet by mouth once daily with food as directed    (6am)    -Gabapentin 100mg - take 2 capsules by mouth every 8 hours as directed    (6am, 2pm, 10pm)    -Tramadol 50mg - take 2 tablets by mouth every 8 hours as directed    (6am, 2pm, 10pm)  -Oxycodone 5mg - take 1 tablet by mouth every 4 hours as needed for breakthrough pain    -Senna Plus 8.6mg-50mg capsule - 2 capsules by mouth twice a day as needed for constipation  -Ondansetron HCL 4mg - take 1 tablet by mouth every 8 hours as needed for nausea    If you have any questions about your medications, please call our office [phone].      Sometimes managing your health at home requires assistance.  A representative from the home health agency will  contact you or your family to schedule your first visit.         [facility]  [address]  [place] [address]  Phone: [phone]  Fax: [phone]

## 2024-10-09 NOTE — PROGRESS NOTES
10/9/2024  Admission date 10/8/2024      S: Patient doing well. Denies any numbness/tingling, F/C/S, N/V/D, SOB, Chest Pain, Abdominal Pain. Pain well-controlled. Up in bedside chair and tolerating activity well.       O:    Vitals:    10/09/24 0438   BP: 129/62   Pulse: 75   Resp: 20   Temp: 98.1 °F (36.7 °C)       Data Review: {  Recent Results (from the past 24 hours)   POCT Glucose    Collection Time: 10/08/24 10:35 AM   Result Value Ref Range    POC Glucose  154 (H) 70 - 99 mg/dL   POCT Glucose    Collection Time: 10/08/24  1:13 PM   Result Value Ref Range    POC Glucose  143 (H) 70 - 99 mg/dL   POCT Glucose    Collection Time: 10/08/24  6:14 PM   Result Value Ref Range    POC Glucose  165 (H) 70 - 99 mg/dL   POCT Glucose    Collection Time: 10/08/24  9:33 PM   Result Value Ref Range    POC Glucose  181 (H) 70 - 99 mg/dL   Basic Metabolic Panel (8)    Collection Time: 10/09/24  5:35 AM   Result Value Ref Range    Glucose 161 (H) 70 - 99 mg/dL    Sodium 134 (L) 136 - 145 mmol/L    Potassium 4.6 3.5 - 5.1 mmol/L    Chloride 103 98 - 112 mmol/L    CO2 25.0 21.0 - 32.0 mmol/L    Anion Gap 6 0 - 18 mmol/L    BUN 22 9 - 23 mg/dL    Creatinine 1.46 (H) 0.55 - 1.02 mg/dL    BUN/CREA Ratio 15.1 10.0 - 20.0    Calcium, Total 8.7 8.7 - 10.4 mg/dL    Calculated Osmolality 285 275 - 295 mOsm/kg    eGFR-Cr 38 (L) >=60 mL/min/1.73m2   Hemoglobin & Hematocrit    Collection Time: 10/09/24  5:35 AM   Result Value Ref Range    HGB 9.7 (L) 12.0 - 16.0 g/dL    HCT 28.7 (L) 35.0 - 48.0 %   POCT Glucose    Collection Time: 10/09/24  7:25 AM   Result Value Ref Range    POC Glucose  177 (H) 70 - 99 mg/dL     Gen:  A&O x 3, VSS, Afebrile    Abd: Soft Nontender    Lower Extremity:   -  Dressing clean, dry, intact, PREVENA to suction  -  2+ Pedal pulses Bilaterally  -  Tibialis Anterior/Gastroc and Soleus/ Extensor Halluses Longus motor in tact  -  Sensory in tact in sural/Saphenous/tibial/superficial peroneal/deep peroneal nerve  distributions  -  Calf soft/non-tender with no palpable cords        A/P: S/P KENN 1 Day Post-Op LTHA    1. Weight Bearing: WBAT w/posterior hip precautions  2. Deep Venous thrombosis prophylaxis - Compression stocking/ASA  3. Continue Physical Therapy, Mobilize out of bed  4. Pain control- modified as needed  5. Discharge Planning- pending clearance from Physical Therapy/Social work services. Plan for discharge to home with home health today.    JAN Sage  Nurse Practitioner for Dr. Joseluis De La Paz  P: [phone]  C: [phone]  F: [phone]

## 2024-10-09 NOTE — CM/SW NOTE
10/09/24 0900   Discharge disposition   Expected discharge disposition Home-Health   Post Acute Care Provider   ([facility])   Discharge transportation Private car     SW informed HH provider of pt discharge and requested follow up with pt/family for SOC in the community.     Minal MARQUEZ, LCSW  Social Work/Case Management

## 2024-10-09 NOTE — PROGRESS NOTES
Face to Face Encounter    I (or a non-physician practitioner working in collaboration with me) had a face to face encounter with this patient during which a medical condition was addressed which is the primary reason for home health care on : 10/9/2024    The encounter was in whole, or in part, for the following medical conditions which is the primary reason for home care. Joint replacement surgery    Based on my findings, the following services are medically necessary home health services (Check all that apply): Nursing, because vital signs monitoring, PT/ INR monitoring and assessment for signs and symptoms of bleeding (if pt on coumadin), wound/ incision assessment, pain assessment and instruction related to pain management, and Physical Therapy, because evaluation of environment for safety (pt is at fall risk), gait training and instruction in the use of assistive devices, instruction and monitoring of therapeutic exercise.    The following clinical findings and patient's condition support that this patient is homebound, because narcotic usage every 4-6 hours, inability to ambulate greater than 150 feet, inability to drive a vehicle, fatigue due to anemia, increased risk for infection and increased risk for bleeding.    Certification for Home Health Services  Based on the above findings, I certify that this patient is confined to the home (meets homebound criteria listed above) and needs intermittent skilled nursing care, physical therapy and /or speech therapy or continues to need occupational therapy.  The patient is under my care, and I have initiated the establishment of the plan of care. This patient will be followed by a physician who will periodically review the plan of care.     Sarah Doll  Nurse Practitioner for Dr. Joseluis De La Paz  P: [phone]  F: [phone]

## 2024-10-09 NOTE — OCCUPATIONAL THERAPY NOTE
OCCUPATIONAL THERAPY EVALUATION - INPATIENT     Room Number: Room 3/Room 3-A  Evaluation Date: 10/9/2024  Type of Evaluation: Quick Eval  Presenting Problem: s/p L KENN (posterior) by Dr. De La Paz 10/8    Physician Order: IP Consult to Occupational Therapy  Reason for Therapy: ADL/IADL Dysfunction and Discharge Planning    OCCUPATIONAL THERAPY ASSESSMENT   Patient is a 69 year old female admitted 10/8/2024 for s/p L KENN (posterior) by Dr. De La Paz.  Prior to admission, patient's baseline is independent with I/ADLs, including driving, and MI with fx mobility/transfers using cane/RW.  Patient is currently functioning near baseline with  ADLs and fx mobility/transfers . Anticipate patient will be able to safely discharge home with additional support when medically cleared.    PLAN  Patient has been evaluated and presents with no skilled Occupational Therapy needs  at this time.  Patient will be discharged from Occupational Therapy services. Please re-order if a new functional limitation presents during this admission.    OT Device Recommendations: Shower chair    OCCUPATIONAL THERAPY MEDICAL/SOCIAL HISTORY   Problem List  Principal Problem:    Osteoarthritis of left hip  Active Problems:    Acquired hypothyroidism    Asthma, extrinsic (HCC)    HTN (hypertension), benign    GRACY (obstructive sleep apnea)    Pure hypercholesterolemia    Type 2 diabetes mellitus without complication, without long-term current use of insulin (HCC)    S/P hip replacement, left    HOME SITUATION  Type of Home: House  Home Layout: Two level  Lives With: Spouse; Daughter  Toilet and Equipment: Comfort height toilet  Shower/Tub and Equipment: Walk-in shower  Other Equipment: Long-handled shoehorn; Long-handled sponge; Reacher  Drives: Yes  Patient Regularly Uses: None  Stairs in Home: 13 steps upstairs with railing  Use of Assistive Device(s): cane/RW    SUBJECTIVE  Patient agreeable to OT evaluation.    OCCUPATIONAL THERAPY EXAMINATION     OBJECTIVE  Precautions: Hip abduction pillow; KENN - posterior; Limb alert - right  Fall Risk: Standard fall risk    WEIGHT BEARING RESTRICTION  L Lower Extremity: Weight Bearing as Tolerated    PAIN ASSESSMENT  Rating: -- (not rated)  Location: L hip  Management Techniques: Body mechanics; Activity promotion; Repositioning    COGNITION  Overall Cognitive Status:  WFL - within functional limits    SENSATION  Light touch:  intact    RANGE OF MOTION   Upper extremity ROM is within functional limits     STRENGTH ASSESSMENT  Upper extremity strength is within functional limits    COORDINATION  Gross Motor: WFL   Fine Motor: WFL     ACTIVITIES OF DAILY LIVING ASSESSMENT  AM-PAC ‘6-Clicks’ Inpatient Daily Activity Short Form  How much help from another person does the patient currently need…  -   Putting on and taking off regular lower body clothing?: A Little  -   Bathing (including washing, rinsing, drying)?: A Little  -   Toileting, which includes using toilet, bedpan or urinal? : A Little  -   Putting on and taking off regular upper body clothing?: None  -   Taking care of personal grooming such as brushing teeth?: None  -   Eating meals?: None    AM-PAC Score:  Score: 21  Approx Degree of Impairment: 32.79%  Standardized Score (AM-PAC Scale): 44.27  CMS Modifier (G-Code): CJ    BED MOBILITY  Not Tested - patient in chair upon therapist arrival    FUNCTIONAL TRANSFER ASSESSMENT  Sit to Stand from Chair: supervision  Chair Transfer: supervision    FUNCTIONAL MOBILITY  supervision for in-room fx mobility using RW    ACTIVITIES OF DAILY LIVING  Eating: independent (per obs)   Grooming: supervision standing at sink (per obs)  UB Dressing: independent   LB Dressing: min assist without LHAE; reported family has been assisting with LB dressing prior to sx; has reacher, long handled shoe horn, and long handled sponge at home  Toileting: supervision seated (per obs)     EDUCATION PROVIDED  Patient Education : Role of  Occupational Therapy; Plan of Care; Discharge Recommendations; DME Recommendations; Weight Bear Status; Fall Prevention; Surgical Precautions; Posture/Positioning; Proper Body Mechanics; Energy Conservation  Patient's Response to Education: Verbalized Understanding; Returned Demonstration    The patient's Approx Degree of Impairment: 32.79% has been calculated based on documentation in the [facility] '6 clicks' Inpatient Daily Activity Short Form.  Research supports that patients with this level of impairment may benefit from discharge home with additional support.  Final disposition will be made by interdisciplinary medical team.    Patient End of Session: Up in chair;Needs met;Call light within reach;RN aware of session/findings;All patient questions and concerns addressed    Patient was able to achieve the following ...   Patient able to toilet transfer   SUP  based on similar fx t/f's    Patient able to dress lower extremities   SUP    Patient/Caregiver able to demonstrate safety with ADLS  at previous functional level     Patient Evaluation Complexity Level:   Occupational Profile/Medical History LOW - Brief history including review of medical or therapy records    Specific performance deficits impacting engagement in ADL/IADL LOW  1 - 3 performance deficits    Client Assessment/Performance Deficits MODERATE - Comorbidities and min to mod modifications of tasks    Clinical Decision Making LOW - Analysis of occupational profile, problem-focused assessments, limited treatment options    Overall Complexity LOW     OT Session Time  Self-Care Home Management: 11 minutes    HUMBERTO James/L  [facility]  #[id number]

## (undated) DEVICE — 3M™ IOBAN™ 2 ANTIMICROBIAL INCISE DRAPE 6650EZ: Brand: IOBAN™ 2

## (undated) DEVICE — APPLICATOR PREP 26ML CHG 2% ISO ALC 70%

## (undated) DEVICE — HOOD: Brand: FLYTE

## (undated) DEVICE — ISLAND DRESSING 4IN X 10IN: Brand: SILVERLON ANTIMICROBIAL WOUND DRESSING

## (undated) DEVICE — NEEDLE SPNL 18GA L3.5IN W/ QNCKE SHARPER BVL

## (undated) DEVICE — SPHERE FOR THA NAVIGATION SYS

## (undated) DEVICE — DRAPE CAM FOR THA NAVIGATION SYS

## (undated) DEVICE — SOLUTION IRRIG 1000ML 0.9% NACL USP BTL

## (undated) DEVICE — ELECTRODE ES L16.5CM BLDE MPLR OPN APPRCH EZ

## (undated) DEVICE — 1010 S-DRAPE TOWEL DRAPE 10/BX: Brand: STERI-DRAPE™

## (undated) DEVICE — INTENDED USE FOR SURGICAL MARKING ON INTACT SKIN, ALSO PROVIDES A PERMANENT METHOD OF IDENTIFYING OBJECTS IN THE OPERATING ROOM: Brand: WRITESITE® PLUS MINI PREP RESISTANT MARKER

## (undated) DEVICE — TOWEL,OR,DSP,ST,BLUE,DLX,2/PK,40PK/CS: Brand: MEDLINE

## (undated) DEVICE — FEMORAL DISC, ELLIPSE: Brand: INTELLIJOINT HIP®

## (undated) DEVICE — 35 ML SYRINGE LUER-LOCK TIP: Brand: MONOJECT

## (undated) DEVICE — 3M™ COBAN™ NL STERILE NON-LATEX SELF-ADHERENT WRAP, 2084S, 4 IN X 5 YD (10 CM X 4,5 M), 18 ROLLS/CASE: Brand: 3M™ COBAN™

## (undated) DEVICE — SPONGE 4X4 10PK

## (undated) DEVICE — ADHESIVE SKIN TOP FOR WND CLSR DERMBND ADV

## (undated) DEVICE — SUT ETHBND XL 2 30IN V-37 NABSRB GRN 40MM 1/2

## (undated) DEVICE — SUT COAT VCRL 0 27IN CP-1 ABSRB UD 36MM 1/2

## (undated) DEVICE — SHEET,DRAPE,70X100,STERILE: Brand: MEDLINE

## (undated) DEVICE — SLIM BODY SKIN STAPLER: Brand: APPOSE ULC

## (undated) DEVICE — BIT DRL 3.2X30MM HIP DISP FOR 2

## (undated) DEVICE — SUT VCRL 2-0 36IN CT-1 ABSRB UD L36MM 1/2 CIR

## (undated) DEVICE — SCREW, PELVIC, G2, 136MM: Brand: INTELLIJOINT HIP®

## (undated) DEVICE — 2T11 #2 PDO 36 X 36: Brand: 2T11 #2 PDO 36 X 36

## (undated) DEVICE — KIT NEG PRSS PREVENA DRAIN 20CM INCIS MGMT PEEL PALACE

## (undated) DEVICE — FEE TECH INTELLIJOINT

## (undated) DEVICE — SOLUTION IRRIG 3000ML 0.9% NACL FLX CONT

## (undated) DEVICE — PACK CDS TOTAL HIP

## (undated) DEVICE — DRAPE,U/ SHT,SPLIT,PLAS,STERIL: Brand: MEDLINE

## (undated) DEVICE — 60 ML SYRINGE LUER-LOCK TIP: Brand: MONOJECT

## (undated) DEVICE — GAMMEX® PI HYBRID SIZE 8.5, STERILE POWDER-FREE SURGICAL GLOVE, POLYISOPRENE AND NEOPRENE BLEND: Brand: GAMMEX

## (undated) DEVICE — SCREW, FEMUR, G2: Brand: INTELLIJOINT HIP®

## (undated) DEVICE — SHEET,DRAPE,53X77,STERILE: Brand: MEDLINE

## (undated) DEVICE — 3M™ TEGADERM™ TRANSPARENT FILM DRESSING FRAME STYLE, 1626W, 4 IN X 4-3/4 IN (10 CM X 12 CM), 50/CT 4CT/CASE: Brand: 3M™ TEGADERM™